# Patient Record
Sex: MALE | Race: WHITE | Employment: UNEMPLOYED | URBAN - METROPOLITAN AREA
[De-identification: names, ages, dates, MRNs, and addresses within clinical notes are randomized per-mention and may not be internally consistent; named-entity substitution may affect disease eponyms.]

---

## 2021-03-23 ENCOUNTER — APPOINTMENT (OUTPATIENT)
Dept: GENERAL RADIOLOGY | Age: 56
DRG: 194 | End: 2021-03-23
Payer: MEDICAID

## 2021-03-23 ENCOUNTER — APPOINTMENT (OUTPATIENT)
Dept: CT IMAGING | Age: 56
DRG: 194 | End: 2021-03-23
Payer: MEDICAID

## 2021-03-23 ENCOUNTER — HOSPITAL ENCOUNTER (INPATIENT)
Age: 56
LOS: 5 days | Discharge: HOME HEALTH | DRG: 194 | End: 2021-03-29
Attending: EMERGENCY MEDICINE | Admitting: INTERNAL MEDICINE
Payer: MEDICAID

## 2021-03-23 DIAGNOSIS — R00.0 TACHYCARDIA: ICD-10-CM

## 2021-03-23 DIAGNOSIS — R55 SYNCOPE AND COLLAPSE: ICD-10-CM

## 2021-03-23 DIAGNOSIS — E27.8 ADRENAL NODULE (HCC): ICD-10-CM

## 2021-03-23 DIAGNOSIS — J96.01 ACUTE RESPIRATORY FAILURE WITH HYPOXIA (HCC): Primary | ICD-10-CM

## 2021-03-23 DIAGNOSIS — R06.02 SHORTNESS OF BREATH: ICD-10-CM

## 2021-03-23 PROBLEM — M72.2 PLANTAR FASCIITIS, LEFT: Status: ACTIVE | Noted: 2017-04-07

## 2021-03-23 LAB
A/G RATIO: 1 (ref 1.1–2.2)
ALBUMIN SERPL-MCNC: 4.2 G/DL (ref 3.4–5)
ALP BLD-CCNC: 85 U/L (ref 40–129)
ALT SERPL-CCNC: 29 U/L (ref 10–40)
ANION GAP SERPL CALCULATED.3IONS-SCNC: 12 MMOL/L (ref 3–16)
AST SERPL-CCNC: 24 U/L (ref 15–37)
BASOPHILS ABSOLUTE: 0.1 K/UL (ref 0–0.2)
BASOPHILS RELATIVE PERCENT: 0.6 %
BILIRUB SERPL-MCNC: 0.3 MG/DL (ref 0–1)
BUN BLDV-MCNC: 17 MG/DL (ref 7–20)
CALCIUM SERPL-MCNC: 9.9 MG/DL (ref 8.3–10.6)
CHLORIDE BLD-SCNC: 100 MMOL/L (ref 99–110)
CO2: 29 MMOL/L (ref 21–32)
CREAT SERPL-MCNC: 0.7 MG/DL (ref 0.9–1.3)
D DIMER: 406 NG/ML DDU (ref 0–229)
EOSINOPHILS ABSOLUTE: 0.1 K/UL (ref 0–0.6)
EOSINOPHILS RELATIVE PERCENT: 0.6 %
GFR AFRICAN AMERICAN: >60
GFR NON-AFRICAN AMERICAN: >60
GLOBULIN: 4.4 G/DL
GLUCOSE BLD-MCNC: 166 MG/DL (ref 70–99)
HCT VFR BLD CALC: 43.6 % (ref 40.5–52.5)
HEMOGLOBIN: 14.1 G/DL (ref 13.5–17.5)
LYMPHOCYTES ABSOLUTE: 1.3 K/UL (ref 1–5.1)
LYMPHOCYTES RELATIVE PERCENT: 13.7 %
MCH RBC QN AUTO: 27.7 PG (ref 26–34)
MCHC RBC AUTO-ENTMCNC: 32.3 G/DL (ref 31–36)
MCV RBC AUTO: 85.9 FL (ref 80–100)
MONOCYTES ABSOLUTE: 0.6 K/UL (ref 0–1.3)
MONOCYTES RELATIVE PERCENT: 6.4 %
NEUTROPHILS ABSOLUTE: 7.5 K/UL (ref 1.7–7.7)
NEUTROPHILS RELATIVE PERCENT: 78.7 %
PDW BLD-RTO: 15.5 % (ref 12.4–15.4)
PLATELET # BLD: 254 K/UL (ref 135–450)
PMV BLD AUTO: 8.7 FL (ref 5–10.5)
POTASSIUM REFLEX MAGNESIUM: 3.9 MMOL/L (ref 3.5–5.1)
RBC # BLD: 5.07 M/UL (ref 4.2–5.9)
SODIUM BLD-SCNC: 141 MMOL/L (ref 136–145)
TOTAL PROTEIN: 8.6 G/DL (ref 6.4–8.2)
TROPONIN: <0.01 NG/ML
WBC # BLD: 9.5 K/UL (ref 4–11)

## 2021-03-23 PROCEDURE — 93005 ELECTROCARDIOGRAM TRACING: CPT | Performed by: EMERGENCY MEDICINE

## 2021-03-23 PROCEDURE — 85025 COMPLETE CBC W/AUTO DIFF WBC: CPT

## 2021-03-23 PROCEDURE — 71046 X-RAY EXAM CHEST 2 VIEWS: CPT

## 2021-03-23 PROCEDURE — 36415 COLL VENOUS BLD VENIPUNCTURE: CPT

## 2021-03-23 PROCEDURE — 85379 FIBRIN DEGRADATION QUANT: CPT

## 2021-03-23 PROCEDURE — 99284 EMERGENCY DEPT VISIT MOD MDM: CPT

## 2021-03-23 PROCEDURE — 84484 ASSAY OF TROPONIN QUANT: CPT

## 2021-03-23 PROCEDURE — 80053 COMPREHEN METABOLIC PANEL: CPT

## 2021-03-23 PROCEDURE — 70450 CT HEAD/BRAIN W/O DYE: CPT

## 2021-03-23 ASSESSMENT — ENCOUNTER SYMPTOMS
BACK PAIN: 0
SHORTNESS OF BREATH: 1
RHINORRHEA: 0
ABDOMINAL PAIN: 0

## 2021-03-24 ENCOUNTER — APPOINTMENT (OUTPATIENT)
Dept: GENERAL RADIOLOGY | Age: 56
DRG: 194 | End: 2021-03-24
Payer: MEDICAID

## 2021-03-24 ENCOUNTER — APPOINTMENT (OUTPATIENT)
Dept: CT IMAGING | Age: 56
DRG: 194 | End: 2021-03-24
Payer: MEDICAID

## 2021-03-24 PROBLEM — J96.01 ACUTE RESPIRATORY FAILURE WITH HYPOXIA (HCC): Status: ACTIVE | Noted: 2021-03-24

## 2021-03-24 PROBLEM — R07.9 CHEST PAIN: Status: ACTIVE | Noted: 2021-03-24

## 2021-03-24 LAB
ANION GAP SERPL CALCULATED.3IONS-SCNC: 9 MMOL/L (ref 3–16)
BASOPHILS ABSOLUTE: 0 K/UL (ref 0–0.2)
BASOPHILS RELATIVE PERCENT: 0.4 %
BILIRUBIN URINE: NEGATIVE
BLOOD, URINE: NEGATIVE
BUN BLDV-MCNC: 13 MG/DL (ref 7–20)
CALCIUM SERPL-MCNC: 9 MG/DL (ref 8.3–10.6)
CHLORIDE BLD-SCNC: 100 MMOL/L (ref 99–110)
CLARITY: CLEAR
CO2: 31 MMOL/L (ref 21–32)
COLOR: YELLOW
CREAT SERPL-MCNC: 0.6 MG/DL (ref 0.9–1.3)
EKG ATRIAL RATE: 91 BPM
EKG ATRIAL RATE: 95 BPM
EKG DIAGNOSIS: NORMAL
EKG DIAGNOSIS: NORMAL
EKG P AXIS: 76 DEGREES
EKG P-R INTERVAL: 172 MS
EKG P-R INTERVAL: 204 MS
EKG Q-T INTERVAL: 378 MS
EKG Q-T INTERVAL: 382 MS
EKG QRS DURATION: 146 MS
EKG QRS DURATION: 152 MS
EKG QTC CALCULATION (BAZETT): 469 MS
EKG QTC CALCULATION (BAZETT): 475 MS
EKG R AXIS: -61 DEGREES
EKG R AXIS: -63 DEGREES
EKG T AXIS: 63 DEGREES
EKG T AXIS: 90 DEGREES
EKG VENTRICULAR RATE: 91 BPM
EKG VENTRICULAR RATE: 95 BPM
EOSINOPHILS ABSOLUTE: 0 K/UL (ref 0–0.6)
EOSINOPHILS RELATIVE PERCENT: 0.4 %
GFR AFRICAN AMERICAN: >60
GFR NON-AFRICAN AMERICAN: >60
GLUCOSE BLD-MCNC: 133 MG/DL (ref 70–99)
GLUCOSE URINE: NEGATIVE MG/DL
HCT VFR BLD CALC: 40.7 % (ref 40.5–52.5)
HEMOGLOBIN: 13.4 G/DL (ref 13.5–17.5)
KETONES, URINE: NEGATIVE MG/DL
LEUKOCYTE ESTERASE, URINE: NEGATIVE
LYMPHOCYTES ABSOLUTE: 1.4 K/UL (ref 1–5.1)
LYMPHOCYTES RELATIVE PERCENT: 13.7 %
MCH RBC QN AUTO: 28.2 PG (ref 26–34)
MCHC RBC AUTO-ENTMCNC: 33 G/DL (ref 31–36)
MCV RBC AUTO: 85.6 FL (ref 80–100)
MICROSCOPIC EXAMINATION: NORMAL
MONOCYTES ABSOLUTE: 0.7 K/UL (ref 0–1.3)
MONOCYTES RELATIVE PERCENT: 7.3 %
NEUTROPHILS ABSOLUTE: 8 K/UL (ref 1.7–7.7)
NEUTROPHILS RELATIVE PERCENT: 78.2 %
NITRITE, URINE: NEGATIVE
PDW BLD-RTO: 15.6 % (ref 12.4–15.4)
PH UA: 7 (ref 5–8)
PLATELET # BLD: 215 K/UL (ref 135–450)
PMV BLD AUTO: 8.5 FL (ref 5–10.5)
POTASSIUM SERPL-SCNC: 3.9 MMOL/L (ref 3.5–5.1)
PRO-BNP: 891 PG/ML (ref 0–124)
PROTEIN UA: NEGATIVE MG/DL
RBC # BLD: 4.75 M/UL (ref 4.2–5.9)
SODIUM BLD-SCNC: 140 MMOL/L (ref 136–145)
SPECIFIC GRAVITY UA: 1.01 (ref 1–1.03)
TROPONIN: <0.01 NG/ML
TROPONIN: <0.01 NG/ML
TSH REFLEX: 1.04 UIU/ML (ref 0.27–4.2)
URINE REFLEX TO CULTURE: NORMAL
URINE TYPE: NORMAL
UROBILINOGEN, URINE: 1 E.U./DL
WBC # BLD: 10.2 K/UL (ref 4–11)

## 2021-03-24 PROCEDURE — 6360000004 HC RX CONTRAST MEDICATION: Performed by: EMERGENCY MEDICINE

## 2021-03-24 PROCEDURE — 83036 HEMOGLOBIN GLYCOSYLATED A1C: CPT

## 2021-03-24 PROCEDURE — 71260 CT THORAX DX C+: CPT

## 2021-03-24 PROCEDURE — 83880 ASSAY OF NATRIURETIC PEPTIDE: CPT

## 2021-03-24 PROCEDURE — 6360000002 HC RX W HCPCS: Performed by: INTERNAL MEDICINE

## 2021-03-24 PROCEDURE — 84443 ASSAY THYROID STIM HORMONE: CPT

## 2021-03-24 PROCEDURE — 2580000003 HC RX 258: Performed by: INTERNAL MEDICINE

## 2021-03-24 PROCEDURE — 36415 COLL VENOUS BLD VENIPUNCTURE: CPT

## 2021-03-24 PROCEDURE — 6370000000 HC RX 637 (ALT 250 FOR IP): Performed by: INTERNAL MEDICINE

## 2021-03-24 PROCEDURE — 81003 URINALYSIS AUTO W/O SCOPE: CPT

## 2021-03-24 PROCEDURE — 93010 ELECTROCARDIOGRAM REPORT: CPT | Performed by: INTERNAL MEDICINE

## 2021-03-24 PROCEDURE — 1200000000 HC SEMI PRIVATE

## 2021-03-24 PROCEDURE — 82088 ASSAY OF ALDOSTERONE: CPT

## 2021-03-24 PROCEDURE — 84484 ASSAY OF TROPONIN QUANT: CPT

## 2021-03-24 PROCEDURE — 85025 COMPLETE CBC W/AUTO DIFF WBC: CPT

## 2021-03-24 PROCEDURE — 73610 X-RAY EXAM OF ANKLE: CPT

## 2021-03-24 PROCEDURE — 93005 ELECTROCARDIOGRAM TRACING: CPT | Performed by: INTERNAL MEDICINE

## 2021-03-24 PROCEDURE — 80048 BASIC METABOLIC PNL TOTAL CA: CPT

## 2021-03-24 PROCEDURE — 84244 ASSAY OF RENIN: CPT

## 2021-03-24 RX ORDER — ASPIRIN 81 MG/1
81 TABLET, CHEWABLE ORAL DAILY
Status: DISCONTINUED | OUTPATIENT
Start: 2021-03-24 | End: 2021-03-25

## 2021-03-24 RX ORDER — SODIUM CHLORIDE 0.9 % (FLUSH) 0.9 %
10 SYRINGE (ML) INJECTION PRN
Status: DISCONTINUED | OUTPATIENT
Start: 2021-03-24 | End: 2021-03-29 | Stop reason: HOSPADM

## 2021-03-24 RX ORDER — ACETAMINOPHEN 325 MG/1
650 TABLET ORAL EVERY 6 HOURS PRN
Status: DISCONTINUED | OUTPATIENT
Start: 2021-03-24 | End: 2021-03-29 | Stop reason: HOSPADM

## 2021-03-24 RX ORDER — ONDANSETRON 2 MG/ML
4 INJECTION INTRAMUSCULAR; INTRAVENOUS EVERY 6 HOURS PRN
Status: DISCONTINUED | OUTPATIENT
Start: 2021-03-24 | End: 2021-03-29 | Stop reason: HOSPADM

## 2021-03-24 RX ORDER — HYDRALAZINE HYDROCHLORIDE 20 MG/ML
5 INJECTION INTRAMUSCULAR; INTRAVENOUS EVERY 4 HOURS PRN
Status: DISCONTINUED | OUTPATIENT
Start: 2021-03-24 | End: 2021-03-29 | Stop reason: HOSPADM

## 2021-03-24 RX ORDER — ACETAMINOPHEN 650 MG/1
650 SUPPOSITORY RECTAL EVERY 6 HOURS PRN
Status: DISCONTINUED | OUTPATIENT
Start: 2021-03-24 | End: 2021-03-29 | Stop reason: HOSPADM

## 2021-03-24 RX ORDER — METOPROLOL TARTRATE 50 MG/1
50 TABLET, FILM COATED ORAL 2 TIMES DAILY
Status: DISCONTINUED | OUTPATIENT
Start: 2021-03-24 | End: 2021-03-25

## 2021-03-24 RX ORDER — POLYETHYLENE GLYCOL 3350 17 G/17G
17 POWDER, FOR SOLUTION ORAL DAILY PRN
Status: DISCONTINUED | OUTPATIENT
Start: 2021-03-24 | End: 2021-03-29 | Stop reason: HOSPADM

## 2021-03-24 RX ORDER — SODIUM CHLORIDE 0.9 % (FLUSH) 0.9 %
10 SYRINGE (ML) INJECTION EVERY 12 HOURS SCHEDULED
Status: DISCONTINUED | OUTPATIENT
Start: 2021-03-24 | End: 2021-03-29 | Stop reason: HOSPADM

## 2021-03-24 RX ORDER — FUROSEMIDE 10 MG/ML
40 INJECTION INTRAMUSCULAR; INTRAVENOUS 2 TIMES DAILY
Status: DISCONTINUED | OUTPATIENT
Start: 2021-03-24 | End: 2021-03-28

## 2021-03-24 RX ORDER — PROMETHAZINE HYDROCHLORIDE 25 MG/1
12.5 TABLET ORAL EVERY 6 HOURS PRN
Status: DISCONTINUED | OUTPATIENT
Start: 2021-03-24 | End: 2021-03-29 | Stop reason: HOSPADM

## 2021-03-24 RX ADMIN — METOPROLOL TARTRATE 50 MG: 50 TABLET, FILM COATED ORAL at 13:21

## 2021-03-24 RX ADMIN — FUROSEMIDE 40 MG: 10 INJECTION, SOLUTION INTRAMUSCULAR; INTRAVENOUS at 12:43

## 2021-03-24 RX ADMIN — HYDRALAZINE HYDROCHLORIDE 5 MG: 20 INJECTION INTRAMUSCULAR; INTRAVENOUS at 20:01

## 2021-03-24 RX ADMIN — Medication 10 ML: at 20:01

## 2021-03-24 RX ADMIN — IOPAMIDOL 80 ML: 755 INJECTION, SOLUTION INTRAVENOUS at 00:37

## 2021-03-24 RX ADMIN — ENOXAPARIN SODIUM 40 MG: 40 INJECTION SUBCUTANEOUS at 10:33

## 2021-03-24 RX ADMIN — ASPIRIN 81 MG: 81 TABLET, CHEWABLE ORAL at 13:21

## 2021-03-24 RX ADMIN — FUROSEMIDE 40 MG: 10 INJECTION, SOLUTION INTRAMUSCULAR; INTRAVENOUS at 18:52

## 2021-03-24 RX ADMIN — Medication 10 ML: at 10:34

## 2021-03-24 ASSESSMENT — PAIN SCALES - GENERAL: PAINLEVEL_OUTOF10: 0

## 2021-03-24 NOTE — ED PROVIDER NOTES
Negative for back pain. Neurological: Positive for light-headedness. Negative for speech difficulty and weakness. PAST MEDICAL HISTORY   History reviewed. No pertinent past medical history. SURGICALHISTORY     History reviewed. No pertinent surgical history. CURRENT MEDICATIONS       Previous Medications    No medications on file       ALLERGIES     Cats claw (uncaria tomentosa)    FAMILY HISTORY     History reviewed. No pertinent family history.        SOCIAL HISTORY       Social History     Socioeconomic History    Marital status: Single     Spouse name: None    Number of children: None    Years of education: None    Highest education level: None   Occupational History    None   Social Needs    Financial resource strain: None    Food insecurity     Worry: None     Inability: None    Transportation needs     Medical: None     Non-medical: None   Tobacco Use    Smoking status: Never Smoker   Substance and Sexual Activity    Alcohol use: Yes     Comment: rare    Drug use: Never    Sexual activity: None   Lifestyle    Physical activity     Days per week: None     Minutes per session: None    Stress: None   Relationships    Social connections     Talks on phone: None     Gets together: None     Attends Jainism service: None     Active member of club or organization: None     Attends meetings of clubs or organizations: None     Relationship status: None    Intimate partner violence     Fear of current or ex partner: None     Emotionally abused: None     Physically abused: None     Forced sexual activity: None   Other Topics Concern    None   Social History Narrative    None       SCREENINGS             PHYSICAL EXAM    (up to 7 for level 4, 8 or more for level 5)     ED Triage Vitals [03/23/21 2143]   BP Temp Temp Source Pulse Resp SpO2 Height Weight   (!) 176/83 98.1 °F (36.7 °C) Oral 96 22 94 % 6' 2\" (1.88 m) (!) 453 lb (205.5 kg)       Physical Exam  Vitals signs and nursing note reviewed. Constitutional:       Appearance: Normal appearance. He is obese. He is not ill-appearing. HENT:      Head: Normocephalic and atraumatic. Right Ear: External ear normal.      Left Ear: External ear normal.      Nose: Nose normal.   Eyes:      General: No scleral icterus. Right eye: No discharge. Left eye: No discharge. Conjunctiva/sclera: Conjunctivae normal.   Neck:      Musculoskeletal: Neck supple. Cardiovascular:      Rate and Rhythm: Normal rate and regular rhythm. Heart sounds: Normal heart sounds. Pulmonary:      Effort: Pulmonary effort is normal. No respiratory distress. Breath sounds: Normal breath sounds. No wheezing or rales. Abdominal:      General: Bowel sounds are normal. There is no distension. Palpations: Abdomen is soft. Tenderness: There is no abdominal tenderness. There is no guarding or rebound. Skin:     Coloration: Skin is not pale. Neurological:      Mental Status: He is alert. Psychiatric:         Mood and Affect: Mood normal.         Behavior: Behavior normal.           DIAGNOSTIC RESULTS     EKG: All EKG's are interpreted by the Emergency Department Physician who either signs or Co-signs this chart in the absence of a cardiologist.    12 lead EKG shows normal sinus rhythm at a rate of 95 bpm, SC interval and QTc normal but he does have prolonged QRS. He has right bundle branch block and also left anterior fascicular block pattern. No acute ischemic findings and no previous for comparison. RADIOLOGY:   Non-plain film images such as CT, Ultrasound and MRI are read by the radiologist. Plain radiographic images are visualized and preliminarily interpreted by the emergency physician with the below findings:        Interpretation per the Radiologist below, if available at the time of this note:    CT CHEST PULMONARY EMBOLISM W CONTRAST   Final Result   1. Limited evaluation. No central pulmonary embolism.    2. 2.8 cm left adrenal nodule. Favor adenoma, although not clearly    characterized due to artifact. Consider adrenal protocol CT or chemical-   shift adrenal MRI follow-up study. 3.  Compression deformity of the T12 vertebral body and degenerative    changes. Age indeterminate, although favor chronic appearance. XR CHEST (2 VW)   Final Result   1. Low lung volumes otherwise no acute abnormality. CT HEAD WO CONTRAST   Final Result   1. No acute intracranial abnormality. ED BEDSIDE ULTRASOUND:   Performed by ED Physician - none    LABS:  Labs Reviewed   CBC WITH AUTO DIFFERENTIAL - Abnormal; Notable for the following components:       Result Value    RDW 15.5 (*)     All other components within normal limits    Narrative:     Performed at:  [facility]  [facility] [employer]  [facility] [employer]   Phone [phone]   COMPREHENSIVE METABOLIC PANEL W/ REFLEX TO MG FOR LOW K - Abnormal; Notable for the following components:    Glucose 166 (*)     CREATININE 0.7 (*)     Total Protein 8.6 (*)     Albumin/Globulin Ratio 1.0 (*)     All other components within normal limits    Narrative:     Performed at:  [facility]  [employer]  [facility] [employer]   Phone [phone]   D-DIMER, QUANTITATIVE - Abnormal; Notable for the following components:    D-Dimer, Quant 406 (*)     All other components within normal limits    Narrative:     Performed at:  [facility]  [employer]  [employer] [facility] [employer]   Phone [phone]   TROPONIN    Narrative:     Performed at:  [facility]  [employer] [employer]  [facility] [employer]   Phone [phone]       All other labs were within normal range or not returned as of this dictation.     EMERGENCY DEPARTMENT COURSE and DIFFERENTIAL DIAGNOSIS/MDM:   Vitals:    Vitals:    03/23/21 2143 03/24/21 0311   BP: (!) 176/83 (!) 161/66   Pulse: 96 107   Resp: 22 25   Temp: 98.1 °F (36.7 °C)    TempSrc: Oral    SpO2: 94% 94%   Weight: (!) 453 lb (205.5 kg)    Height: 6' 2\" (1.88 m)        Middle-age man who comes in for syncopal episode x2 and shortness of breath. Patient was immediately placed on cardiac monitor blood pressure and pulse oximetry monitoring. Laboratory chest x-ray EKG and CT head ordered. Cardiac monitor as interpreted by myself shows sinus tachycardia although he was not initially tachycardic. He becomes tachycardic with minimal movement. Laboratory studies show an elevated D-dimer. CT pulmonary embolism study is therefore ordered. Chest x-ray and EKG shows no acute abnormality. CT of his head was ordered because patient said he passed out twice and he is head was on the floor the car. I want to evaluate for any acute intracranial hemorrhage. .  This is negative for acute process. CT pulmonary embolism does not show any acute process. The exact cause of this patient's symptoms and not known however the patient will become tachycardic into the 130s and be dyspneic with just sitting up in bed, the patient does not have a primary care provider established and does not follow-up with the primary care setting. I believe the patient is high risk for serious life-threatening conditions and he should be further evaluated. A consult is placed to the hospitalist at [facility] who has accepted this patient to their service. CRITICAL CARE TIME   Total Critical Care time was 32 minutes, excluding separately reportable procedures. There was a high probability of clinically significant/life threatening deterioration in the patient's condition which required my urgent intervention. CONSULTS:  IP CONSULT TO HOSPITALIST    PROCEDURES:       Procedures    FINAL IMPRESSION      1. Syncope and collapse    2. Shortness of breath    3. Adrenal nodule ([address]    4.  Tachycardia

## 2021-03-24 NOTE — ED TRIAGE NOTES
Patient c/o SOB, brought in via [place] [facility], got SOB after got out of car, Patient denies CP, resp rapid, talking continuously.  Placed on O2 sat and cardiac monitor

## 2021-03-24 NOTE — ED NOTES
Patient waiting to hear about transportation. Transportation aware that pt needs a bariatric bed.      Lynette Saenz RN  03/24/21 8661

## 2021-03-24 NOTE — ED NOTES
Patient talking on telephone.  ST on monitor, waiting test results, states that he feels better     Gallito Gandhi RN  03/23/21 7190

## 2021-03-24 NOTE — ED NOTES
Patient report given to [facility] ems, patient belongings packed patient to [facility].      Sarah Braden RN  03/24/21 4049

## 2021-03-24 NOTE — CARE COORDINATION
CM Following, pt from home alone, ind at baseline, pt with co SOP CP and rt ankle /knee swelling, Xray ordered, pt with frequency bladder scan ordered, on RA at this time. Pt will need PT/OT evals for DC recs. Plans to DC home poss HHC at [facility].   Electronically signed by Bart Montague RN on 3/24/2021 at 1:16 PM  [phone]

## 2021-03-24 NOTE — ED NOTES
Report called to EUGENE South at [facility]. Aware pt ETA 0900. Will call back with updates before patient leaves.   RM [id number] [facility], EUGENE  03/24/21 EUGENE Reyes  03/24/21 110 [facility] Neetu Potter RN  03/24/21 6767 VIEW ALL

## 2021-03-24 NOTE — PLAN OF CARE
Problem: Falls - Risk of:  Goal: Will remain free from falls  Description: Will remain free from falls  Outcome: Met This Shift  Note: Pt has remained free of falls, as Pt is a fall risk. Bed/chair alarm remains armed and bed is in lowest position. Pt's room door remains open, and call light is within reach. Pt has on non-skid socks and calls out appropriately .  Pt is significantly unsteady, and requires 2 people to assist with ambulation

## 2021-03-24 NOTE — ED NOTES
Patient uncomfortable in bed, OK per EMD if patient sits up in chair. Wide chair placed in room, patient assisted from bed to chair,  Did not want to sit on a pillow. Denies CP or SOB. C/o R ankle pain, thinks he may have hurt earlier when getting out of car at [address] and falling. No obvious swelling. EMD aware.      Erica Loaiza RN  03/24/21 1753

## 2021-03-24 NOTE — H&P
History and Physical    Admit Date: 3/23/2021    Patient's PCP: Dr. Catalina Stone primary care provider on file. Chief complaint: shortness of breath and syncope. HISTORY OF PRESENT ILLNESS:    This is a very pleasant 50 y.o. male with morbid obesity (wt 453 lbs), but no apparent known chronic known medical conditions, who presented to the emergency department at [facility], with shortness of breath and syncope. He has had shortness of breath for months, but has really not sought evaluation for this, and has basically been compensating for it. Recently, he has had more shortness of breath than usual. He describes dyspnea on exertion, but denies cough or wheezing. He has also had bilateral leg swelling for several weeks to months. He denies chest pain, or palpitations. He has not had fever or chills. He does not do much, daily due to his morbid obesity. Today he had to do a secret  event. He drove to the store and as he was getting out of the store, he felt lightheaded and severely short of breath. The next thing he remembers is that he was found with his knees on the ground, and someone was yelling \". ..are you okay? \". He initially declined needing assistance however when he tried to get up again the same thing happened and the person called the ambulance. Patient denies any chest pain. He has never had this in the past.  He received the Covid vaccine about a week ago. He has had urinary frequency for some time as well as nocturia. In the ED, he was afebrile. Sats were 94, but noted there to be 87 on at least 2 occassions. RR ranged from 22-31. HR was 96 on presentation but noted to go into the 130s, atimes. BP was elevated at 176/83 (140s to 170s while in the ED). Labwork showed essentially normal CBC and CMP. D dimer was marginally elevated at 406. Troponin was negative. 12 lead EKG showed normal sinus rhythm at a rate of 95 bpm and apparent bifascicular block.   No acute ischemic findings and no previous for comparison. CT head : No acute intracranial abnormality; CT chest: Limited evaluation. No central pulmonary embolism. He was admitted for further evaluation.          Past Medical / Surgical History:    History reviewed. No pertinent past medical history. History reviewed. No pertinent surgical history. Medications Prior to Admission:    No current facility-administered medications on file prior to encounter. No current outpatient medications on file prior to encounter. Allergies:  Cats claw (uncaria tomentosa)    Social History:   TOBACCO:   reports that he has never smoked. He does not have any smokeless tobacco history on file. ETOH:   reports current alcohol use. Family History:   History reviewed. No pertinent family history. ROS: Review of Systems - Negative except as in HPI. All other systems reviewed and are negative. PHYSICAL EXAM:  BP (!) 170/96   Pulse 99   Temp 98.3 °F (36.8 °C)   Resp 24   Ht 6' 2\" (1.88 m)   Wt (!) 453 lb (205.5 kg)   SpO2 92%   BMI 58.16 kg/m²     No results for input(s): POCGLU in the last 72 hours. Constitutional:       Appearance: Normal appearance. He is morbidly obese. He is not ill-appearing. HENT:      Head: Normocephalic and atraumatic. Right Ear: External ear normal.      Left Ear: External ear normal.      Nose: Nose normal.   Eyes:      General: No scleral icterus. Right eye: No discharge. Left eye: No discharge. Conjunctiva/sclera: Conjunctivae normal.   Neck:      Musculoskeletal: Neck supple. Obese  Cardiovascular:      Rate and Rhythm: Normal rate Irregular rhythm. Heart sounds: Normal heart sounds. Bilateral pitting leg edema, grade 2-3 with some broken skin and serous drainage. Pulmonary:      Effort: Pulmonary effort is normal. No respiratory distress. Breath sounds: Normal breath sounds. No wheezing or rales.    Abdominal:      General: Bowel sounds be admitted to telemetry  We will trend his troponin  Check proBNP  Obtain echocardiogram  Check TSH, Hemoglobin A1c   Start IV Lasix  Consider initiating beta-blocker therapy  Patient will need ischemia evaluation  Cardiology consult      He has an adrenal incidentaloma which will require further characterization  We will obtain some initial hormonal work-up      We will require sleep study in the outpatient setting        Morbid obesity due to excess calories Body mass index is 58.16 kg/m². - Complicating assessment and treatment. Placing patient at risk for multiple co-morbidities as well as early death and contributing to the patient's presentation.  on weight loss       The patient and / or the family were informed of the results of any tests, a time was given to answer questions, a plan was proposed and they agreed with plan. Thank you Dr. Whelan primary care provider on file. for the opportunity to be involved in this patients care. If you have any questions or concerns please feel free to contact me at [phone].     Full Code    Evaristo Brody MD

## 2021-03-24 NOTE — PROGRESS NOTES
Pt complaining of pain in R ankle and R knee. +4 pitting edema noted in BLE. R ankle appears more swollen than L. MD notified     Updated: Pt's BP is 174/108.  MD notified

## 2021-03-24 NOTE — PROGRESS NOTES
4 Eyes Admission Assessment     I agree as the admission nurse that 2 RN's have performed a thorough Head to Toe Skin Assessment on the patient. ALL assessment sites listed below have been assessed on admission. Areas assessed by both nurses:   [x]   Head, Face, and Ears   [x]   Shoulders, Back, and Chest  [x]   Arms, Elbows, and Hands   [x]   Coccyx, Sacrum, and Ischium  [x]   Legs, Feet, and Heels        Does the Patient have Skin Breakdown?   No         - mositure-related redness noted in groin area and abdominal pannus  - blister R big toe  - scattered abrasions  - scattered bruising     Yuriy Prevention initiated:  Yes   Wound Care Orders initiated:  NA      Lars Ferrer consulted for Pressure Injury (Stage 3,4, Unstageable, DTI, NWPT, and Complex wounds) or Yuriy score 18 or lower:  No      Nurse 1 eSignature: Electronically signed by Lucrecia Hernandez RN on 3/24/21 at 6:00 PM EDT    **SHARE this note so that the co-signing nurse is able to place an eSignature**    Nurse 2 eSignature: Electronically signed by Wilmer Camara RN on 3/26/21 at 2:30 AM EDT

## 2021-03-24 NOTE — PROGRESS NOTES
Pt arrived to unit around 200. VSS with exception to elevated BP. Pt denies SOB, nausea, and pain at this time.  MD notified of Pt's arrival. Awaiting new orders

## 2021-03-24 NOTE — ED NOTES
Received call from Transportation, [facility] at [facility] tomorrow afternoon, they will call [employer] at 0800 to see if they can get a sooner time.       Lynette Saenz RN  03/24/21 9885

## 2021-03-24 NOTE — ED NOTES
Patient returned to bed from bedside commode patient updated on [facility] to [id number]  Cecile Segura [facility] Berenice Marshall md bedside to speak with patient.      Mary Briggs RN  03/24/21 1882

## 2021-03-25 PROBLEM — R55 SYNCOPE AND COLLAPSE: Status: ACTIVE | Noted: 2021-03-25

## 2021-03-25 PROBLEM — E66.9 OBESITY: Status: ACTIVE | Noted: 2021-03-25

## 2021-03-25 PROBLEM — I48.92 ATRIAL FLUTTER (HCC): Status: ACTIVE | Noted: 2021-03-25

## 2021-03-25 LAB
ANION GAP SERPL CALCULATED.3IONS-SCNC: 9 MMOL/L (ref 3–16)
BUN BLDV-MCNC: 16 MG/DL (ref 7–20)
CALCIUM SERPL-MCNC: 9.1 MG/DL (ref 8.3–10.6)
CHLORIDE BLD-SCNC: 98 MMOL/L (ref 99–110)
CO2: 32 MMOL/L (ref 21–32)
CORTISOL TOTAL: 13.1 UG/DL
CREAT SERPL-MCNC: 0.6 MG/DL (ref 0.9–1.3)
ESTIMATED AVERAGE GLUCOSE: 145.6 MG/DL
GFR AFRICAN AMERICAN: >60
GFR NON-AFRICAN AMERICAN: >60
GLUCOSE BLD-MCNC: 108 MG/DL (ref 70–99)
HBA1C MFR BLD: 6.7 %
HCT VFR BLD CALC: 39.3 % (ref 40.5–52.5)
HEMOGLOBIN: 12.9 G/DL (ref 13.5–17.5)
LV EF: 58 %
LVEF MODALITY: NORMAL
MAGNESIUM: 1.8 MG/DL (ref 1.8–2.4)
MCH RBC QN AUTO: 28.3 PG (ref 26–34)
MCHC RBC AUTO-ENTMCNC: 32.9 G/DL (ref 31–36)
MCV RBC AUTO: 86.1 FL (ref 80–100)
PDW BLD-RTO: 15.5 % (ref 12.4–15.4)
PLATELET # BLD: 210 K/UL (ref 135–450)
PMV BLD AUTO: 8.9 FL (ref 5–10.5)
POTASSIUM SERPL-SCNC: 3.4 MMOL/L (ref 3.5–5.1)
RBC # BLD: 4.56 M/UL (ref 4.2–5.9)
SODIUM BLD-SCNC: 139 MMOL/L (ref 136–145)
WBC # BLD: 8.7 K/UL (ref 4–11)

## 2021-03-25 PROCEDURE — 99223 1ST HOSP IP/OBS HIGH 75: CPT | Performed by: INTERNAL MEDICINE

## 2021-03-25 PROCEDURE — 97530 THERAPEUTIC ACTIVITIES: CPT

## 2021-03-25 PROCEDURE — 6360000002 HC RX W HCPCS: Performed by: INTERNAL MEDICINE

## 2021-03-25 PROCEDURE — 97162 PT EVAL MOD COMPLEX 30 MIN: CPT

## 2021-03-25 PROCEDURE — 97116 GAIT TRAINING THERAPY: CPT

## 2021-03-25 PROCEDURE — 97166 OT EVAL MOD COMPLEX 45 MIN: CPT

## 2021-03-25 PROCEDURE — 1200000000 HC SEMI PRIVATE

## 2021-03-25 PROCEDURE — C8929 TTE W OR WO FOL WCON,DOPPLER: HCPCS

## 2021-03-25 PROCEDURE — 6370000000 HC RX 637 (ALT 250 FOR IP): Performed by: INTERNAL MEDICINE

## 2021-03-25 PROCEDURE — 85027 COMPLETE CBC AUTOMATED: CPT

## 2021-03-25 PROCEDURE — 36415 COLL VENOUS BLD VENIPUNCTURE: CPT

## 2021-03-25 PROCEDURE — 6360000004 HC RX CONTRAST MEDICATION: Performed by: INTERNAL MEDICINE

## 2021-03-25 PROCEDURE — 2580000003 HC RX 258: Performed by: INTERNAL MEDICINE

## 2021-03-25 PROCEDURE — 82533 TOTAL CORTISOL: CPT

## 2021-03-25 PROCEDURE — 97535 SELF CARE MNGMENT TRAINING: CPT

## 2021-03-25 PROCEDURE — 83735 ASSAY OF MAGNESIUM: CPT

## 2021-03-25 PROCEDURE — 80048 BASIC METABOLIC PNL TOTAL CA: CPT

## 2021-03-25 RX ORDER — POTASSIUM CHLORIDE 20 MEQ/1
40 TABLET, EXTENDED RELEASE ORAL EVERY 4 HOURS
Status: COMPLETED | OUTPATIENT
Start: 2021-03-25 | End: 2021-03-25

## 2021-03-25 RX ORDER — MAGNESIUM SULFATE IN WATER 40 MG/ML
2000 INJECTION, SOLUTION INTRAVENOUS ONCE
Status: COMPLETED | OUTPATIENT
Start: 2021-03-25 | End: 2021-03-25

## 2021-03-25 RX ADMIN — APIXABAN 5 MG: 5 TABLET, FILM COATED ORAL at 21:16

## 2021-03-25 RX ADMIN — POTASSIUM CHLORIDE 40 MEQ: 1500 TABLET, EXTENDED RELEASE ORAL at 16:16

## 2021-03-25 RX ADMIN — Medication 10 ML: at 21:17

## 2021-03-25 RX ADMIN — PERFLUTREN 1.65 MG: 6.52 INJECTION, SUSPENSION INTRAVENOUS at 11:07

## 2021-03-25 RX ADMIN — Medication 10 ML: at 09:03

## 2021-03-25 RX ADMIN — APIXABAN 5 MG: 5 TABLET, FILM COATED ORAL at 16:15

## 2021-03-25 RX ADMIN — METOPROLOL TARTRATE 25 MG: 25 TABLET, FILM COATED ORAL at 21:16

## 2021-03-25 RX ADMIN — FUROSEMIDE 40 MG: 10 INJECTION, SOLUTION INTRAMUSCULAR; INTRAVENOUS at 09:02

## 2021-03-25 RX ADMIN — METOPROLOL TARTRATE 25 MG: 25 TABLET, FILM COATED ORAL at 11:35

## 2021-03-25 RX ADMIN — ENOXAPARIN SODIUM 40 MG: 40 INJECTION SUBCUTANEOUS at 09:02

## 2021-03-25 RX ADMIN — FUROSEMIDE 40 MG: 10 INJECTION, SOLUTION INTRAMUSCULAR; INTRAVENOUS at 17:45

## 2021-03-25 RX ADMIN — POTASSIUM CHLORIDE 40 MEQ: 1500 TABLET, EXTENDED RELEASE ORAL at 11:36

## 2021-03-25 RX ADMIN — ASPIRIN 81 MG: 81 TABLET, CHEWABLE ORAL at 09:03

## 2021-03-25 RX ADMIN — MAGNESIUM SULFATE IN WATER 2000 MG: 40 INJECTION, SOLUTION INTRAVENOUS at 17:31

## 2021-03-25 ASSESSMENT — PAIN SCALES - GENERAL
PAINLEVEL_OUTOF10: 0

## 2021-03-25 NOTE — CONSULTS
History and Physical  [facility]   Cardiology    Chief Complaint: progressive severe sob and admitted post syncope    HPI:     Patient is a 50 y.o. male  presents with a progressive 4 years increase in sob. He lately has only able to walk short distances without severe sob. At this course he has gained in the range of  lbs, now weighing 400-450 lbs depending on the scale. He was going to shop in a store for a \"study\" that he was being paid for. He just got out of his car, stood up sob. He then became lightheaded and fell, unconscious. After awakening a passer by came to help and after standing up again he passed out again. EMS was called by bystander. The EMS report is not available at this time. The patient was found  In an atypical flutter rhythm that spontaneously converted on the floor around 2:00 pm yesterday. He had another spell of syncope two years ago then he was totally breathless going to an office and was trying to conceal the fact that he was so. An urgent care visit was supposedly negative and he did not follow up. History reviewed. No pertinent past medical history. History reviewed. No pertinent surgical history. No medications prior to admission. Allergies   Allergen Reactions    Cats Claw (Uncaria Tomentosa) Hives      Social History     Tobacco Use    Smoking status: Never Smoker   Substance Use Topics    Alcohol use: Yes     Comment: rare      History reviewed. No pertinent family history.      Review of Systems:  · Constitutional: Weight gain  · Eyes: No decreased vision  · ENT: No Headaches, Hearing Loss or Vertigo  · Cardiovascular: dyspnea on exertion,  loss of consciousness  · Respiratory: No cough or wheezing    · Gastrointestinal: No abdominal pain, appetite loss, blood in stools, constipation, diarrhea or heartburn  · Genitourinary: No dysuria, trouble voiding, or hematuria  · Musculoskeletal:  No gait disturbance, weakness or joint complaints  · Integumentary: No rash or pruritis  · Neurological: No TIA or stroke symptoms  · Psychiatric: No anxiety or depression  · Endocrine: No malaise, fatigue or temperature intolerance  · Hematologic/Lymphatic: No bleeding problems, blood clots or swollen lymph nodes  · Allergic/Immunologic: No nasal congestion or hives      Objective Data:     /88   Pulse 70   Temp 97.7 °F (36.5 °C) (Oral)   Resp 20   Ht 6' 2\" (1.88 m)   Wt (!) 405 lb 6.8 oz (183.9 kg)   SpO2 90%   BMI 52.05 kg/m²     General appearance: alert, appears stated age, cooperative and morbidly obese  Alert, awake, oriented x 3  Eyes:  No erythema  Head: atraumatic  Neck: no carotid bruit and no JVD  Lungs: clear to auscultation bilaterally  Heart: regular rate and rhythm, S1, S2 normal, no murmur, click, rub or gallop  Abdomen: soft, non-tender; bowel sounds normal; no masses,  no organomegaly  Extremities: edema 2-3 plus  Skin: Skin color, texture, turgor normal. No rashes or lesions  Hematologic: no remarkable bruising       ECG: atrial flutter with variable:1 block, left anterior hemiblock or RBBB and left ventricular hypertrophy     Data Review    Recent Labs     03/23/21 2238 03/24/21  1054 03/25/21  0524    140 139   K 3.9 3.9 3.4*    100 98*   CO2 29 31 32   BUN 17 13 16   CREATININE 0.7* 0.6* 0.6*     Recent Labs     03/23/21 2238 03/24/21  1055 03/25/21  0524   WBC 9.5 10.2 8.7   HGB 14.1 13.4* 12.9*   HCT 43.6 40.7 39.3*   MCV 85.9 85.6 86.1    215 210     Lab Results   Component Value Date    TROPONINI <0.01 03/24/2021         Assessment:     Active Problems:    Acute respiratory failure with hypoxia (HCC)    Syncope and collapse    Atrial flutter (HCC)    Obesity  Resolved Problems:    * No resolved hospital problems. *      Plan:     1. The syncope presentation is logically atrial flutter with rapid rapid related. His sinus slowing episodes are confirmed during sleep by RN and go away when she awakens him.  He surely has sleep apnea as the cause of asymptomatic sinus bradycardia while asleep in hospital.  No pathologic av block has been observed yet. 2. His progressive sob is profound over last 3-4 years and logically related to his increasing weight, deconditioning , sleep apnea and likely diastolic heart disease from HT/obesity/ sleep apnea etc. Diuresis to improve breathing is logical for now. Echo is ordered  3. The atrial flutter appears atypical but likely right sided with V1 negative and also inferior leads. He of course has multiple factors that predispose to atrial flutter. Note metoprolol held for sinus bradycardia while asleep. Will try lower dose but again sleep apnea logical cause of kelli. Will monitor for recurrence of atrial flutter. His CHA2s2 vasc score appears to be 2. Strongly consider [address] Would start with low dose beta-blocker( likely syncope from rapid atrial flutter) and then if no more atrial flutter in hospital outpatient monitoring/possible ILR is future. 4. Obviously needs sleep apnea treated, weight loss, bp/diabetes treated.    5. Team will follow

## 2021-03-25 NOTE — PROGRESS NOTES
Progress Note    Admit Date: 3/23/2021    Patient's PCP: Dr. Shonda Lord primary care provider on file. Chief complaint: shortness of breath and syncope. 50 y.o. male with morbid obesity (wt 453 lbs), but no apparent known chronic known medical conditions, who presented to the emergency department at [facility], with shortness of breath and syncope. He has had shortness of breath for months, but has really not sought evaluation for this, and has basically been compensating for it. Recently, he has had more shortness of breath than usual. He describes dyspnea on exertion, but denies cough or wheezing. He has also had bilateral leg swelling for several weeks to months. He denies chest pain, or palpitations. He has not had fever or chills. He does not do much, daily due to his morbid obesity. Today he had to do a secret  event. He drove to the store and as he was getting out of the store, he felt lightheaded and severely short of breath. The next thing he remembers is that he was found with his knees on the ground, and someone was yelling \". ..are you okay? \". He initially declined needing assistance however when he tried to get up again the same thing happened and the person called the ambulance. Patient denies any chest pain. He has never had this in the past.  He received the Covid vaccine about a week ago. He has had urinary frequency for some time as well as nocturia. In the ED, he was afebrile. Sats were 94, but noted there to be 87 on at least 2 occassions. RR ranged from 22-31. HR was 96 on presentation but noted to go into the 130s, atimes. BP was elevated at 176/83 (140s to 170s while in the ED). Labwork showed essentially normal CBC and CMP. D dimer was marginally elevated at 406. Troponin was negative. 12 lead EKG showed normal sinus rhythm at a rate of 95 bpm and apparent bifascicular block. No acute ischemic findings and no previous for comparison.  CT head : No acute intracranial abnormality; CT chest: Limited evaluation. No central pulmonary embolism. He was admitted for further evaluation. Interval HPI  Noted overnight with episodes of bradycardia with heart rates down to the 30s, mainly while asleep    Denies new complaints today    No further episodes of syncope    No chest pain    No dyspnea    Fever or chills        PHYSICAL EXAM:  BP (!) 176/90   Pulse 77   Temp 97.7 °F (36.5 °C) (Oral)   Resp 20   Ht 6' 2\" (1.88 m)   Wt (!) 405 lb 6.8 oz (183.9 kg)   SpO2 91%   BMI 52.05 kg/m²     No results for input(s): POCGLU in the last 72 hours. Constitutional:       Appearance: Normal appearance. He is morbidly obese. He is not ill-appearing. Neck:      Musculoskeletal: Neck supple. Obese  Cardiovascular:      Rate and Rhythm: Normal rate Irregular rhythm. Heart sounds: Normal heart sounds. Bilateral pitting leg edema, grade 2-3 with some broken skin and serous drainage. Pulmonary:      Effort: Pulmonary effort is normal. No respiratory distress. Breath sounds: Normal breath sounds. No wheezing or rales. Abdominal:      General: Bowel sounds are normal. There is no distension. Palpations: Abdomen is soft. Tenderness: There is no abdominal tenderness. There is no guarding or rebound. Neurological:      Mental Status: He is alert.    Psychiatric:         Mood and Affect: Mood normal        LABS:  Recent Labs     03/23/21 2238 03/24/21  1055 03/25/21  0524   WBC 9.5 10.2 8.7   HGB 14.1 13.4* 12.9*   HCT 43.6 40.7 39.3*    215 210                                                                  Recent Labs     03/23/21 2238 03/24/21  1054 03/25/21  0524    140 139   K 3.9 3.9 3.4*    100 98*   CO2 29 31 32   BUN 17 13 16   CREATININE 0.7* 0.6* 0.6*   GLUCOSE 166* 133* 108*     Recent Labs     03/23/21 2238   AST 24   ALT 29   BILITOT 0.3   ALKPHOS 85     Recent Labs     03/23/21 2238 03/24/21  1055 03/24/21

## 2021-03-25 NOTE — PROGRESS NOTES
Educated patient on importance of accurate I/O when on IV lasix. Patient states he will watch and restrict his intake more, agreeable to using a graduated cylinder at bedside to urinate. Daily weights ordered.

## 2021-03-25 NOTE — PROGRESS NOTES
Pt is alert and oriented. Vsstable. No c/o of pain at this time. NPO. x2 with walker. No new skin issues not listed in LDA's. Plan for today is pt to go to ECHO. Pt did have episodes of SB but pt returned to normal range after being woken up. Will continue to monitor.

## 2021-03-25 NOTE — PROGRESS NOTES
Patient alert and oriented over night. BP elevated at start of shift - IV hydralazine given per PRN order. HR dropped into the 30s when sleeping, patient asymptomatic. Voiding in a graduate for accurate I/Os. Bed alarm remains on.

## 2021-03-25 NOTE — CARE COORDINATION
[facility]  CHF Team Conference Note      Patient ID: Rhenda Sicard 49 y.o. 1965    Admission Date: 3/23/2021   Admission Weight: 453lbs  Discharge Date: 3/29/2021  Discharge Weight: 430lbs    30 Day Readmit? No  Reason for Admission: Shortness of Breath; Syncope    Pt History and Precipitating Cause of Decompensation:   Pt has no documented CHF hx. Pt has not been seen by a physician in \"quite some time\" and does not has a PCP. Echo with minimal findings. Pt did still respond to diuresis. Given new HTN diagnosis, afib/aflutter concern, potential diabetes and potential sleep apnea pt has multiple risk factors for CHF. Pt educated on CHF and risk of further progression of Left ventricular hypertrophy to CHF. Pt to have even monitor for 2 weeks and follow up with EP NP in 6. Pt scheduled a 7 day CHF follow up, pt aware. Pt open to teaching and following with cardiology and a pcp regularly. Ejection Fraction: Summary 3/25/2021   Technically difficult examination. Left ventricular cavity size is normal.   There is moderate concentric left ventricular hypertrophy. Overall left   ventricular systolic function appears normal with an ejection fraction of   55-60%. No regional wall motion abnormalities are noted. Normal diastolic   function.     Has patient been diagnosed with MICH: No, given out patient sleep study referral.   Is patient being treated: No, being sent home on new home o2    Cardiology Consulted: Yes  Heart Failure Order Set Used: Yes  Complete Intake and Output: No  Complete Daily Weights: No    BMP:  Lab Results   Component Value Date     03/28/2021     03/27/2021     03/26/2021    K 4.3 03/28/2021    K 4.3 03/27/2021    K 3.9 03/26/2021    K 3.9 03/23/2021    CL 96 03/28/2021    CL 99 03/27/2021    CL 99 03/26/2021    CO2 25 03/28/2021    CO2 33 03/27/2021    CO2 31 03/26/2021    BUN 25 03/28/2021    BUN 22 03/27/2021    BUN 19 03/26/2021    CREATININE 0.8 03/28/2021    CREATININE 0.7 03/27/2021    CREATININE 0.7 03/26/2021     BNP:   Lab Results   Component Value Date    PROBNP 220 03/28/2021    PROBNP 891 03/24/2021           ACTIVITY/FUNCTIONAL ASSESSMENT:   Physical Therapy:  Assessment: Pt demo improved mobility/activity tolerance this date and now on 3L 02. Pt reports he will go home with 02 \"as needed\" and is pending sleep study. Pt denies any issues going home alone. We discussed at length working to DFine his apartment for safety and potentially seeking help for laundry which requires 2 flights of steps. Pt plans to return home at discharge. If home, recommend assist PRN. Pt would benefit from bariatric rolling walker for longer distances. Will sign off for acute PT. Recommend nursing encourage ambulation PRN and often with supervision    Occupational Therapy:  Assessment: Pt doing well with functional activities. No acute OT needs identified. Pt expresssed concerns with toileting hygiene; OT educated pt on use of wet wipes or a toilet bidet attachement. D/C pt from acute OT. Cardiac Rehab:  Patient provided with education: Yes  Patient given pamphlet: Yes  Patient provided information on how to contact: Yes      Time Spent Educating/Exercising: 15mins      NUTRITION:  Diet Orders / Intake / Nutrition Support  Current diet/supplement order: DIET CARB CONTROL; Low Sodium (2 GM)       Pt does not currently follow a low sodium diet at home. Pt states understanding of education. Instructed the Patient on:    Low Sodium foods   Sodium free   Fluids       Educational Materials Provided:    [facility] ([facility]) Heart Failure Education folder- Nutrition Therapy     -General Diet Recommendations: minimize processed foods, reduce sodium intake, minimize simple sugars and 2 liter/day fluid restriction.     Time spent with patient: 15 minutes           PHARMACY  During Admission  If EF <40% ACE/ARB ordered or contraindication documented:  EF > 40%  If EF <40% Evidence-Based Beta Blocker ordered or contraindication documented:   EF > 40%  If EF 35% or less, K <5on admit, Cr<2 (female) <2.5 (male), Aldosterone antagonist ordered:  EF > 35%  Upon Discharge  If EF <40% ACE/ARB ordered or contraindication documented:   If EF <40% Evidence-Based Beta Blocker ordered or contraindication documented:  If EF 35% or less, K <5on admit, Cr<2 (female) <2.5 (male), Aldosterone antagonist ordered:   If discharge on Spironalactone / Aldactone / Eppleronone/ Inspra - is the patient's Potassium acceptable N/A  Received Influenza Vaccine (Oct-Mar) Yes  DVT Prophylaxis by Day 2: Yes  Prescription drug coverage: Yes  Can afford prescription co-pay: No issues identified    Time Spent Educating: 10 min  Reviewed importance of taking medications as prescribed, and general tips to help remember when to take medications. Also discussed the importance of daily weights and when to call her doctor for changes in weight. Patient verbalized understanding and teach back method was used for verification. All questions answered. Patient told to call with further questions.     Pharmacist Signature:   Jose J Mcdonough, PharmD, BCPS, BCGP  [id number] ([facility])   3/29/2021 3:44 PM         NURSING  Patient Education:       Worsening HF symptoms Yes    fluid restriction  Yes    Tobacco cessation (Smoked in the last 12 months) No  daily weights and parameters  Yes  Avoid NSAIDS in HF Yes  written HF education given Yes     Follow up appointment Yes    ICD counseling No  Patient can Teach Back:   Worsening HF symptoms and when to report Yes   Weight gain to report to healthcare provider Yes  Amount of sodium to eat per day Yes  Name of their Bonna Colindres Yes    Time Spent Educatinmins        Total Time Spent on Patient Education:  60minutesYes    DISCHARGE PLAN:  Services at Discharge: [address] [facility], Occupational Therapy and CHF Nurse 3x week  Community Resources:   Equipment at

## 2021-03-25 NOTE — PLAN OF CARE
Problem: Pain:  Goal: Pain level will decrease  Description: Pain level will decrease  Outcome: Ongoing  Note: Silvio Salinas reported no pain during this shift. Goal: Control of acute pain  Description: Control of acute pain  Outcome: Ongoing  Goal: Control of chronic pain  Description: Control of chronic pain  Outcome: Ongoing     Problem: Falls - Risk of:  Goal: Will remain free from falls  Description: Will remain free from falls  3/25/2021 0224 by Keerthi España RN  Outcome: Ongoing  Note: Silvio Salinas remained safe and free of falls during this shift, a bed alarm was used to ensure safety. 3/24/2021 1504 by Kasey Gurrola RN  Outcome: Met This Shift  Note: Pt has remained free of falls, as Pt is a fall risk. Bed/chair alarm remains armed and bed is in lowest position. Pt's room door remains open, and call light is within reach. Pt has on non-skid socks and calls out appropriately . Pt is significantly unsteady, and requires 2 people to assist with ambulation  Goal: Absence of physical injury  Description: Absence of physical injury  Outcome: Ongoing     Problem: Fluid Volume:  Goal: Risk for excess fluid volume will decrease  Description: Risk for excess fluid volume will decrease  Outcome: Ongoing  Note: Silvio Salinas had good output after receiving lasix, he was educated on heart failure. Goal: Maintenance of adequate hydration will improve  Description: Maintenance of adequate hydration will improve  Outcome: Ongoing  Goal: Will show no signs and symptoms of electrolyte imbalance  Description: Will show no signs and symptoms of electrolyte imbalance  Outcome: Ongoing     Problem: Nutritional:  Goal: Maintenance of adequate nutrition will improve  Description: Maintenance of adequate nutrition will improve  Outcome: Ongoing  Note: Silvio Salinas was educated on importance of watching in intake in relation to heart failure.

## 2021-03-25 NOTE — PROGRESS NOTES
Occupational Therapy   Occupational Therapy Initial Assessment /Treatment/Discharge  Date: 3/25/2021   Patient Name: Tj Simpson  MRN: [id number]     : 1965    Date of Service: 3/25/2021    Discharge Recommendations:    Tj Simpson scored a 21/24 on the AM-PAC ADL Inpatient form. At this time, no further OT is recommended upon discharge. .  Recommend patient returns to prior setting with prior services. OT Equipment Recommendations  Equipment Needed: Yes    Assessment   Assessment: Pt doing well with functional activities. No acute OT needs identified. Pt expresssed concerns with toileting hygiene; OT educated pt on use of wet wipes or a toilet bidet attachement. D/C pt from acute OT. Prognosis: Good  Decision Making: Medium Complexity  OT Education: OT Role;Plan of Care;ADL Adaptive Strategies;Transfer Training;Energy Conservation;Equipment  Patient Education: Pt verbalized understanding  No Skilled OT: No OT goals identified  REQUIRES OT FOLLOW UP: No  Activity Tolerance  Activity Tolerance: Patient limited by fatigue  Activity Tolerance: Req frequent rests secondary to HARMON. Safety Devices  Safety Devices in place: Yes  Type of devices: Left in chair;Call light within reach; Chair alarm in place;Nurse notified           Restrictions  Position Activity Restriction  Other position/activity restrictions: up with assist    Subjective   General  Chart Reviewed: Yes  Additional Pertinent Hx: Pt admitted 3/23/21 with shortness of breath and syncope. Head CT(-)    CXR (-)    CT chest: neg PE, 2.8 cm left adrenal nodule. Favor adenoma.       x-ray Rt ankle= diffuse swelling, no fracture        PMH: morbid obesity  Family / Caregiver Present: No  Referring Practitioner: Dr. Bernie Pop  Diagnosis: Chest pain  Subjective  Subjective: Pt in bed upon entry.     Pt very talkative  Patient Currently in Pain: Denies  Vital Signs  Level of Consciousness: Alert (0)  Patient Currently in Pain: Denies by assistance(HOB elevated, with rail, increased effort to complete)  Transfers  Stand Step Transfers: Stand by assistance  Sit to stand: Stand by assistance  Stand to sit: Stand by assistance     Cognition  Overall Cognitive Status: WNL                 LUE AROM (degrees)  LUE AROM : WFL  RUE AROM (degrees)  RUE AROM : WFL  LUE Strength  Gross LUE Strength: WNL  RUE Strength  Gross RUE Strength: WNL     Hand Dominance  Hand Dominance: Right     Treatment included ADL and transfer training.         Plan   Plan  Plan Comment: Discharge pt from acute OT    AM-PAC Score        AM-[facility] Inpatient Daily Activity Raw Score: 21 (03/25/21 1529)  AM-PAC Inpatient ADL T-Scale Score : 44.27 (03/25/21 1529)  ADL Inpatient CMS 0-100% Score: 32.79 (03/25/21 1529)  ADL Inpatient CMS G-Code Modifier : CJ (03/25/21 1529)    Goals    No goals indicated       Therapy Time   Individual Concurrent Group Co-treatment   Time In 1154         Time Out 1240         Minutes 46         Timed Code Treatment Minutes: 1 Abdirizak Benedict, OTR/L [id number]

## 2021-03-25 NOTE — PROGRESS NOTES
Physical Therapy    Facility[facility]Department: [facility] [address] [place] [address]  Initial Assessment    NAME: Florence Adams  : 1965  MRN: [id number]    Date of Service: 3/25/2021    Discharge Recommendations:    Florence Adams scored a 18/24 on the AM-PAC short mobility form. Current research shows that an AM-PAC score of 18 or greater is typically associated with a discharge to the patient's home setting. Based on the patient's AM-PAC score and their current functional mobility deficits, it is recommended that the patient have 2-3 sessions per week of Physical Therapy at d/c to increase the patient's independence. At this time, this patient demonstrates the endurance and safety to discharge home with home PT and a follow up treatment frequency of 2-3x/wk. Please see assessment section for further patient specific details. If patient discharges prior to next session this note will serve as a discharge summary. Please see below for the latest assessment towards goals. PT Equipment Recommendations  Equipment Needed: (may benefit from rolling walker - pt states it helped but is concerned about tight spaces within apartment and ability to carry it up/down steps or to get it in/out of car if he would leave it in the car.)    Assessment   Body structures, Functions, Activity limitations: Decreased functional mobility ; Decreased endurance  Assessment: Pt likely close to baseline - reports normally walks short distances without AD PTA 2/2 SOB. Currently needing CG/SBA. Encouraged pt to be OOB and ambulating with staff as tolerated. Pt verbalized understanding. Plans to return home to apartment at d/c - reports mother is able to help him if needed and could stay in her apartment if unable to do steps.   Rec cont skilled PT to maximize mobility and independence  Treatment Diagnosis: impaired functional mobility 2/2 decreased endurance  Decision Making: Medium Complexity  PT Education: Goals;PT Role;Plan of Care;General Safety; Functional Mobility Training  Patient Education: Pt verbalized understanding       Patient Diagnosis(es): The primary encounter diagnosis was Syncope and collapse. Diagnoses of Shortness of breath, Adrenal nodule ([address] and Tachycardia were also pertinent to this visit. has no past medical history on file. has no past surgical history on file. Restrictions  Position Activity Restriction  Other position/activity restrictions: up with assist  Vision/Hearing        Subjective  General  Chart Reviewed: Yes  Additional Pertinent Hx: Pt is a 50 y.o. male adm 3/23 with chest pain. Pt presented to the emergency department for shortness of breath and syncope. Head CT: neg. CXR: neg. CT chest: neg PE, 2.8 cm left adrenal nodule. Favor adenoma. R ankle Xray: diffuse swelling, no fracture   PMH: morbid obesity  Referring Practitioner: Rajendra Stanley MD  Referral Date : 03/24/21  Subjective  Subjective: Pt found supine. Agreeable to PT. Talkative and pleasant  Pain Screening  Patient Currently in Pain: Denies  Vital Signs  Patient Currently in Pain: Denies       Orientation  Orientation  Overall Orientation Status: Within Functional Limits  Social/Functional History  Social/Functional History  Lives With: Alone  Type of Home: Apartment(2nd floor)  Home Layout: Laundry in basement  Home Access: Stairs to enter without rails  Entrance Stairs - Number of Steps: 2 PEGGY; 7+7 steps w/rail to apt level  Bathroom Shower/Tub: Tub/Shower unit  Bathroom Toilet: Standard(vanity next to toilet)  Bathroom Equipment: Hand-held shower  Home Equipment: Crutches  ADL Assistance: Independent  Homemaking Assistance: Independent  Ambulation Assistance: Independent(without AD. Reports can only walk short distances before needs to stop and catch breath.   Holds onto things in apartment while ambulating or when stopping to rest)  Transfer Assistance: Independent  Active : Yes  Occupation: Part time employment  Type of occupation: consumer surveys, secret   Additional Comments: No recent falls. Reports mother lives in same apartment building on main level - she can assist him if needed and he could stay in her apartment if he had to. Cognition        Objective          AROM RLE (degrees)  RLE AROM: WFL  AROM LLE (degrees)  LLE AROM : WFL  Strength RLE  Strength RLE: WFL  Strength LLE  Strength LLE: WFL        Bed mobility  Supine to Sit: Stand by assistance(HOB elevated, with rail, increased effort to complete)  Transfers  Sit to Stand: Stand by assistance  Stand to sit: Stand by assistance  Ambulation  Ambulation?: Yes  Ambulation 1  Device: No Device  Assistance: Contact guard assistance(to SBA)  Quality of Gait: decreased bilat step length/height, occas reaches out for UE support,HARMON  Distance: 10', 5'  Comments: Amb 21' with bariatric rolling walker with CGA. Cues to stay close to walker. HARMON.               Plan   Plan  Times per week: 2-5  Current Treatment Recommendations: Functional Mobility Training, Endurance Training, Gait Training, Stair training, Patient/Caregiver Education & Training, Safety Education & Training  Safety Devices  Type of devices: Call light within reach, Chair alarm in place, Nurse notified, Left in chair    G-Code       OutComes Score                                                  AM-PAC Score  AM-PAC Inpatient Mobility Raw Score : 18 (03/25/21 1317)  AM-PAC Inpatient T-Scale Score : 43.63 (03/25/21 1317)  Mobility Inpatient CMS 0-100% Score: 46.58 (03/25/21 1317)  Mobility Inpatient CMS G-Code Modifier : CK (03/25/21 1317)          Goals  Short term goals  Time Frame for Short term goals: By discharge  Short term goal 1: Sup to sit modified independent  Short term goal 2: Pt will transfer sit to stand supervision  Short term goal 3: Pt will amb >80' with LRAD with supervision  Short term goal 4: Pt will up/down flight of steps with rail supervision       Therapy Time   Individual

## 2021-03-26 LAB
ANION GAP SERPL CALCULATED.3IONS-SCNC: 11 MMOL/L (ref 3–16)
BUN BLDV-MCNC: 19 MG/DL (ref 7–20)
CALCIUM SERPL-MCNC: 9.2 MG/DL (ref 8.3–10.6)
CHLORIDE BLD-SCNC: 99 MMOL/L (ref 99–110)
CO2: 31 MMOL/L (ref 21–32)
CREAT SERPL-MCNC: 0.7 MG/DL (ref 0.9–1.3)
GFR AFRICAN AMERICAN: >60
GFR NON-AFRICAN AMERICAN: >60
GLUCOSE BLD-MCNC: 103 MG/DL (ref 70–99)
HCT VFR BLD CALC: 40.4 % (ref 40.5–52.5)
HEMOGLOBIN: 13.2 G/DL (ref 13.5–17.5)
MAGNESIUM: 2 MG/DL (ref 1.8–2.4)
MCH RBC QN AUTO: 28.4 PG (ref 26–34)
MCHC RBC AUTO-ENTMCNC: 32.7 G/DL (ref 31–36)
MCV RBC AUTO: 87 FL (ref 80–100)
PDW BLD-RTO: 15.4 % (ref 12.4–15.4)
PLATELET # BLD: 208 K/UL (ref 135–450)
PMV BLD AUTO: 9.1 FL (ref 5–10.5)
POTASSIUM SERPL-SCNC: 3.9 MMOL/L (ref 3.5–5.1)
RBC # BLD: 4.64 M/UL (ref 4.2–5.9)
SODIUM BLD-SCNC: 141 MMOL/L (ref 136–145)
WBC # BLD: 8.1 K/UL (ref 4–11)

## 2021-03-26 PROCEDURE — 83735 ASSAY OF MAGNESIUM: CPT

## 2021-03-26 PROCEDURE — 80048 BASIC METABOLIC PNL TOTAL CA: CPT

## 2021-03-26 PROCEDURE — 6370000000 HC RX 637 (ALT 250 FOR IP): Performed by: INTERNAL MEDICINE

## 2021-03-26 PROCEDURE — 2580000003 HC RX 258: Performed by: INTERNAL MEDICINE

## 2021-03-26 PROCEDURE — 97116 GAIT TRAINING THERAPY: CPT

## 2021-03-26 PROCEDURE — 99232 SBSQ HOSP IP/OBS MODERATE 35: CPT | Performed by: NURSE PRACTITIONER

## 2021-03-26 PROCEDURE — 6360000002 HC RX W HCPCS: Performed by: INTERNAL MEDICINE

## 2021-03-26 PROCEDURE — 36415 COLL VENOUS BLD VENIPUNCTURE: CPT

## 2021-03-26 PROCEDURE — 85027 COMPLETE CBC AUTOMATED: CPT

## 2021-03-26 PROCEDURE — 1200000000 HC SEMI PRIVATE

## 2021-03-26 RX ORDER — LORAZEPAM 2 MG/ML
4 INJECTION INTRAMUSCULAR ONCE
Status: DISCONTINUED | OUTPATIENT
Start: 2021-03-26 | End: 2021-03-26

## 2021-03-26 RX ORDER — METOPROLOL TARTRATE 50 MG/1
50 TABLET, FILM COATED ORAL 2 TIMES DAILY
Status: DISCONTINUED | OUTPATIENT
Start: 2021-03-26 | End: 2021-03-27

## 2021-03-26 RX ADMIN — Medication 10 ML: at 08:03

## 2021-03-26 RX ADMIN — METOPROLOL TARTRATE 50 MG: 50 TABLET, FILM COATED ORAL at 20:32

## 2021-03-26 RX ADMIN — Medication 10 ML: at 20:33

## 2021-03-26 RX ADMIN — APIXABAN 5 MG: 5 TABLET, FILM COATED ORAL at 20:32

## 2021-03-26 RX ADMIN — FUROSEMIDE 40 MG: 10 INJECTION, SOLUTION INTRAMUSCULAR; INTRAVENOUS at 08:02

## 2021-03-26 RX ADMIN — APIXABAN 5 MG: 5 TABLET, FILM COATED ORAL at 08:01

## 2021-03-26 RX ADMIN — METOPROLOL TARTRATE 25 MG: 25 TABLET, FILM COATED ORAL at 08:01

## 2021-03-26 RX ADMIN — FUROSEMIDE 40 MG: 10 INJECTION, SOLUTION INTRAMUSCULAR; INTRAVENOUS at 18:11

## 2021-03-26 ASSESSMENT — PAIN SCALES - GENERAL: PAINLEVEL_OUTOF10: 0

## 2021-03-26 NOTE — CONSULTS
Nutrition Education    Pt seen per [facility] for CHF diet education. Provided pt with written and verbal instruction on HF nutrition therapy. Discussed low sodium diet, daily weights, mg of sodium for low sodium foods, and high vs low sodium food options. RD also initiated education r/t carbohydrate counting. Pt voiced understanding. Encouraged pt to contact RD with any questions/concerns should they arise. Time spent: 15 minutes    · Verbally reviewed information with Patient  · Educated on CHF diet  · Written educational materials provided. · Contact name and number provided. · Refer to Patient Education activity for more details.     Electronically signed by Esther Cifuentes RD, LD on 3/26/21 at 3:30 PM EDT    Contact: [phone]

## 2021-03-26 NOTE — PROGRESS NOTES
Physical Therapy  Facility/Department: [facility]'[address]  Daily Treatment Note  NAME: Drew Bruno  : 1965  MRN: [id number]    Date of Service: 3/26/2021    Discharge Recommendations:    Drew Bruno scored a 18/24 on the AM-PAC short mobility form. Current research shows that an AM-PAC score of 18 or greater is typically associated with a discharge to the patient's home setting. Based on the patient's AM-PAC score and their current functional mobility deficits, it is recommended that the patient have 2-3 sessions per week of Physical Therapy at d/c to increase the patient's independence. At this time, this patient demonstrates the endurance and safety to discharge home with home PT and a follow up treatment frequency of 2-3x/wk. Please see assessment section for further patient specific details. If patient discharges prior to next session this note will serve as a discharge summary. Please see below for the latest assessment towards goals. PT Equipment Recommendations  Equipment Needed: (rolling walker)    Assessment   Body structures, Functions, Activity limitations: Decreased functional mobility ; Decreased endurance  Assessment: Likely close to baseline function. Limited by SOB. Able to transfer/ambulate short distance with SBA. Reaches out for UE support. Discussed with pt using walker for support. Pt verbalized understanding. \"I think that would help for longer distances but my apartment is so small, I have places I can hold. \"  Rec cont skilled PT to maximize mobility and independence  Treatment Diagnosis: impaired functional mobility 2/2 decreased endurance  PT Education: Goals;PT Role;Plan of Care;General Safety; Functional Mobility Training  Patient Education: Pt verbalized understanding  REQUIRES PT FOLLOW UP: Yes     Patient Diagnosis(es): The primary encounter diagnosis was Syncope and collapse.  Diagnoses of Shortness of breath, Adrenal nodule ([facility] [address] and Tachycardia were Mobility Inpatient CMS G-Code Modifier : CK (03/26/21 1453)          Goals  Short term goals  Time Frame for Short term goals: By discharge  Short term goal 1: Sup to sit modified independent. Ongoing  Short term goal 2: Pt will transfer sit to stand supervision. Ongoing  Short term goal 3: Pt will amb >80' with LRAD with supervision. Ongoing  Short term goal 4: Pt will up/down flight of steps with rail supervision.   Ongoing    Plan    Plan  Times per week: 2-5  Current Treatment Recommendations: Functional Mobility Training, Endurance Training, Gait Training, Stair training, Patient/Caregiver Education & Training, Safety Education & Training  Safety Devices  Type of devices: Call light within reach, Chair alarm in place, Nurse notified, Left in chair     Therapy Time   Individual Concurrent Group Co-treatment   Time In 0936         Time Out 0953         Minutes 17               Timed Code Treatment Minutes: 17      Total Treatment Minutes:  [address], PT

## 2021-03-26 NOTE — PROGRESS NOTES
Pt alert and oriented. VSS. Pt voiding adequately. Denies pain. Call light within reach.  Will continue to monitor

## 2021-03-26 NOTE — PROGRESS NOTES
Progress Note    Admit Date: 3/23/2021    Patient's PCP: Dr. Eloy Burkitt primary care provider on file. Chief complaint: shortness of breath and syncope. 49 y.o. male with morbid obesity (wt 453 lbs), but no apparent known chronic known medical conditions, who presented to the emergency department at [facility], with shortness of breath and syncope. He has had shortness of breath for months, but has really not sought evaluation for this, and has basically been compensating for it. Recently, he has had more shortness of breath than usual. He describes dyspnea on exertion, but denies cough or wheezing. He has also had bilateral leg swelling for several weeks to months. He denies chest pain, or palpitations. He has not had fever or chills. He does not do much, daily due to his morbid obesity. Today he had to do a secret  event. He drove to the store and as he was getting out of the store, he felt lightheaded and severely short of breath. The next thing he remembers is that he was found with his knees on the ground, and someone was yelling \". ..are you okay? \". He initially declined needing assistance however when he tried to get up again the same thing happened and the person called the ambulance. Patient denies any chest pain. He has never had this in the past.  He received the Covid vaccine about a week ago. He has had urinary frequency for some time as well as nocturia. In the ED, he was afebrile. Sats were 94, but noted there to be 87 on at least 2 occassions. RR ranged from 22-31. HR was 96 on presentation but noted to go into the 130s, atimes. BP was elevated at 176/83 (140s to 170s while in the ED). Labwork showed essentially normal CBC and CMP. D dimer was marginally elevated at 406. Troponin was negative. 12 lead EKG showed normal sinus rhythm at a rate of 95 bpm and apparent bifascicular block. No acute ischemic findings and no previous for comparison.  CT head : No acute intracranial abnormality; CT chest: Limited evaluation. No central pulmonary embolism. He was admitted for further evaluation. Interval HPI  Noted overnight with episodes of bradycardia with heart rates down to the 30s, mainly while asleep    Denies new complaints today    No further episodes of syncope    No chest pain    No dyspnea    Fever or chills        PHYSICAL EXAM:  /76   Pulse 56   Temp 98.2 °F (36.8 °C) (Oral)   Resp 18   Ht 6' 2\" (1.88 m)   Wt (!) 405 lb 6.8 oz (183.9 kg)   SpO2 93%   BMI 52.05 kg/m²     No results for input(s): POCGLU in the last 72 hours. Constitutional:       Appearance: Normal appearance. He is morbidly obese. He is not ill-appearing. Neck:      Musculoskeletal: Neck supple. Obese  Cardiovascular:      Rate and Rhythm: Normal rate Irregular rhythm. Heart sounds: Normal heart sounds. Bilateral pitting leg edema, grade 2-3 with some broken skin and serous drainage. Pulmonary:      Effort: Pulmonary effort is normal. No respiratory distress. Breath sounds: Normal breath sounds. No wheezing or rales. Abdominal:      General: Bowel sounds are normal. There is no distension. Palpations: Abdomen is soft. Tenderness: There is no abdominal tenderness. There is no guarding or rebound. Neurological:      Mental Status: He is alert.    Psychiatric:         Mood and Affect: Mood normal        LABS:  Recent Labs     03/24/21  1055 03/25/21  0524 03/26/21  0440   WBC 10.2 8.7 8.1   HGB 13.4* 12.9* 13.2*   HCT 40.7 39.3* 40.4*    210 208                                                                  Recent Labs     03/24/21  1054 03/25/21  0524 03/26/21  0440    139 141   K 3.9 3.4* 3.9    98* 99   CO2 31 32 31   BUN 13 16 19   CREATININE 0.6* 0.6* 0.7*   GLUCOSE 133* 108* 103*     Recent Labs     03/23/21 2238   AST 24   ALT 29   BILITOT 0.3   ALKPHOS 85     Recent Labs     03/23/21 2238 03/24/21  1055 03/24/21  1542 hormonal work-up  Will need o/p f/u for this: Per radiology, 2.8 cm left adrenal nodule.  Favor adenoma, although not clearly characterized due to artifact.  Consider adrenal protocol CT or chemical-shift adrenal MRI follow-up study      Will require sleep study in the outpatient setting    Will set up with Resident's clinic as he has no PCP     Pxt's mother concerned he may be depressed: We will screen for depression. Morbid obesity due to excess calories Body mass index is 52.05 kg/m². - Complicating assessment and treatment. Placing patient at risk for multiple co-morbidities as well as early death and contributing to the patient's presentation.  on weight loss       The patient and / or the family were informed of the results of any tests, a time was given to answer questions, a plan was proposed and they agreed with plan. Thank you Dr. Whelan primary care provider on file. for the opportunity to be involved in this patients care. If you have any questions or concerns please feel free to contact me at [phone]. Full Code       Disposition: Pending Progress.    Possible DC home in 1-2 days pending further diuresis      Brooke Orozco MD

## 2021-03-26 NOTE — PROGRESS NOTES
Physician Progress Note      Rohini Hernandez  [facility] #:                  [id number]  :                       1965  ADMIT DATE:       3/23/2021 9:42 PM  [address] [place] DATE:  RESPONDING  PROVIDER #:        Audie Grajeda MD          QUERY TEXT:    Pt admitted with acute on chronic CHF and has EF of 55 - 60% per 3/25 ECHO. If possible, please document in progress notes and discharge summary further   specificity regarding the type of acute on chronic CHF:    The medical record reflects the following:  Risk Factors: HTN, Morbid obesity, atrial flutter, MICH  Clinical Indicators: . Bilateral pitting leg edema  Treatment: Cardiology consult, ECHO, monitor  Options provided:  -- Acute on Chronic Diastolic CHF/HFpEF  -- Other - I will add my own diagnosis  -- Disagree - Not applicable / Not valid  -- Disagree - Clinically unable to determine / Unknown  -- Refer to Clinical Documentation Reviewer    PROVIDER RESPONSE TEXT:    This patient is in acute on chronic diastolic CHF/HFpEF.     Query created by: Eldon Dumont on 3/25/2021 7:41 PM      Electronically signed by:  Audie Grajeda MD 3/26/2021 3:30 PM

## 2021-03-26 NOTE — PLAN OF CARE
Problem: Falls - Risk of:  Goal: Will remain free from falls  Description: Will remain free from falls  Note: Pt is A&Ox4, is high fall risk, Pt educated and encouraged to use call light to notify and wait for assistance from staff before getting OOB, pt uses call light appropriately, has made no unsafe movements, no attempts to get OOB without assistance. Pt's bed alarm remained on throughout shift, bed in lowest position, 2/4 side rails up, call light and bedside table within reach. No falls so far this shift, will continue to monitor.

## 2021-03-26 NOTE — PLAN OF CARE
Problem: Falls - Risk of:  Goal: Will remain free from falls  Description: Will remain free from falls  3/26/2021 0907 by Lo Tovar RN  Outcome: Ongoing  Note: Pt bed in low position and alarm on. Non skid socks on. Belongings, bedside table, and call light within reach. 2/4 side rails up. Will continue to monitor. 3/26/2021 0236 by Eugenia Roberto RN  Note: Pt is A&Ox4, is high fall risk, Pt educated and encouraged to use call light to notify and wait for assistance from staff before getting OOB, pt uses call light appropriately, has made no unsafe movements, no attempts to get OOB without assistance. Pt's bed alarm remained on throughout shift, bed in lowest position, 2/4 side rails up, call light and bedside table within reach. No falls so far this shift, will continue to monitor. Problem: Fluid Volume:  Goal: Risk for excess fluid volume will decrease  Description: Risk for excess fluid volume will decrease  Outcome: Ongoing  Note: Will continue to administer fluids/ medications as needed.  Continue to monitor I/O's

## 2021-03-27 LAB
ANION GAP SERPL CALCULATED.3IONS-SCNC: 9 MMOL/L (ref 3–16)
BUN BLDV-MCNC: 22 MG/DL (ref 7–20)
CALCIUM SERPL-MCNC: 9.2 MG/DL (ref 8.3–10.6)
CHLORIDE BLD-SCNC: 99 MMOL/L (ref 99–110)
CO2: 33 MMOL/L (ref 21–32)
CREAT SERPL-MCNC: 0.7 MG/DL (ref 0.9–1.3)
GFR AFRICAN AMERICAN: >60
GFR NON-AFRICAN AMERICAN: >60
GLUCOSE BLD-MCNC: 114 MG/DL (ref 70–99)
HCT VFR BLD CALC: 43.9 % (ref 40.5–52.5)
HEMOGLOBIN: 14.2 G/DL (ref 13.5–17.5)
MAGNESIUM: 2 MG/DL (ref 1.8–2.4)
MCH RBC QN AUTO: 28.2 PG (ref 26–34)
MCHC RBC AUTO-ENTMCNC: 32.3 G/DL (ref 31–36)
MCV RBC AUTO: 87.3 FL (ref 80–100)
PDW BLD-RTO: 15.5 % (ref 12.4–15.4)
PLATELET # BLD: 239 K/UL (ref 135–450)
PMV BLD AUTO: 8.9 FL (ref 5–10.5)
POTASSIUM SERPL-SCNC: 4.3 MMOL/L (ref 3.5–5.1)
RBC # BLD: 5.03 M/UL (ref 4.2–5.9)
SODIUM BLD-SCNC: 141 MMOL/L (ref 136–145)
TROPONIN: <0.01 NG/ML
TROPONIN: <0.01 NG/ML
WBC # BLD: 8.3 K/UL (ref 4–11)

## 2021-03-27 PROCEDURE — 6360000002 HC RX W HCPCS: Performed by: INTERNAL MEDICINE

## 2021-03-27 PROCEDURE — 2580000003 HC RX 258: Performed by: INTERNAL MEDICINE

## 2021-03-27 PROCEDURE — 6370000000 HC RX 637 (ALT 250 FOR IP): Performed by: INTERNAL MEDICINE

## 2021-03-27 PROCEDURE — 99233 SBSQ HOSP IP/OBS HIGH 50: CPT | Performed by: NURSE PRACTITIONER

## 2021-03-27 PROCEDURE — 80048 BASIC METABOLIC PNL TOTAL CA: CPT

## 2021-03-27 PROCEDURE — 84484 ASSAY OF TROPONIN QUANT: CPT

## 2021-03-27 PROCEDURE — 83735 ASSAY OF MAGNESIUM: CPT

## 2021-03-27 PROCEDURE — 85027 COMPLETE CBC AUTOMATED: CPT

## 2021-03-27 PROCEDURE — 36415 COLL VENOUS BLD VENIPUNCTURE: CPT

## 2021-03-27 PROCEDURE — 93005 ELECTROCARDIOGRAM TRACING: CPT | Performed by: INTERNAL MEDICINE

## 2021-03-27 PROCEDURE — 1200000000 HC SEMI PRIVATE

## 2021-03-27 RX ORDER — ACETAMINOPHEN, ASPIRIN AND CAFFEINE 250; 250; 65 MG/1; MG/1; MG/1
2 TABLET, FILM COATED ORAL EVERY 6 HOURS PRN
Status: DISCONTINUED | OUTPATIENT
Start: 2021-03-27 | End: 2021-03-29 | Stop reason: HOSPADM

## 2021-03-27 RX ORDER — ACETAMINOPHEN, ASPIRIN AND CAFFEINE 250; 250; 65 MG/1; MG/1; MG/1
1 TABLET, FILM COATED ORAL EVERY 6 HOURS PRN
Status: DISCONTINUED | OUTPATIENT
Start: 2021-03-27 | End: 2021-03-27

## 2021-03-27 RX ADMIN — Medication 10 ML: at 10:28

## 2021-03-27 RX ADMIN — ACETAMINOPHEN, ASPIRIN AND CAFFEINE 2 TABLET: 250; 250; 65 TABLET, FILM COATED ORAL at 17:31

## 2021-03-27 RX ADMIN — FUROSEMIDE 40 MG: 10 INJECTION, SOLUTION INTRAMUSCULAR; INTRAVENOUS at 17:37

## 2021-03-27 RX ADMIN — METOPROLOL TARTRATE 12.5 MG: 25 TABLET, FILM COATED ORAL at 20:03

## 2021-03-27 RX ADMIN — ONDANSETRON 4 MG: 2 INJECTION INTRAMUSCULAR; INTRAVENOUS at 17:40

## 2021-03-27 RX ADMIN — Medication 10 ML: at 20:03

## 2021-03-27 RX ADMIN — FUROSEMIDE 40 MG: 10 INJECTION, SOLUTION INTRAMUSCULAR; INTRAVENOUS at 09:38

## 2021-03-27 RX ADMIN — ACETAMINOPHEN, ASPIRIN AND CAFFEINE 1 TABLET: 250; 250; 65 TABLET, FILM COATED ORAL at 10:57

## 2021-03-27 RX ADMIN — APIXABAN 5 MG: 5 TABLET, FILM COATED ORAL at 09:37

## 2021-03-27 RX ADMIN — APIXABAN 5 MG: 5 TABLET, FILM COATED ORAL at 20:03

## 2021-03-27 RX ADMIN — METOPROLOL TARTRATE 50 MG: 50 TABLET, FILM COATED ORAL at 09:37

## 2021-03-27 ASSESSMENT — PAIN SCALES - GENERAL
PAINLEVEL_OUTOF10: 0
PAINLEVEL_OUTOF10: 3
PAINLEVEL_OUTOF10: 3

## 2021-03-27 NOTE — PLAN OF CARE
Problem: Pain:  Goal: Pain level will decrease  Description: Pain level will decrease  Outcome: Ongoing  Note: C/o migraine type pain. Given Excedrin with improvement. Pain remained ~3-4. Pt able to fall asleep. Will cont to monitor     Problem: Falls - Risk of:  Goal: Will remain free from falls  Description: Will remain free from falls  Outcome: Ongoing  Note: Alarms activated. Call light in reach-uses appropriately. SBA w/ steady gait. Non skid socks on. Bed locked in lowest position with 3/4 rails up     Problem: Fluid Volume:  Goal: Risk for excess fluid volume will decrease  Description: Risk for excess fluid volume will decrease  Outcome: Ongoing  Note: Given Lasix as ordered . Cont's to have pitting edema (+2 BLE). Voiding adequately. Daily weights being completed.  Will monitor

## 2021-03-27 NOTE — PROGRESS NOTES
Progress Note    Admit Date: 3/23/2021    Patient's PCP: Dr. Eloy Burkitt primary care provider on file. Chief complaint: shortness of breath and syncope. 49 y.o. male with morbid obesity (wt 453 lbs), but no apparent known chronic known medical conditions, who presented to the emergency department at [facility], with shortness of breath and syncope. He has had shortness of breath for months, but has really not sought evaluation for this, and has basically been compensating for it. Recently, he has had more shortness of breath than usual. He describes dyspnea on exertion, but denies cough or wheezing. He has also had bilateral leg swelling for several weeks to months. He denies chest pain, or palpitations. He has not had fever or chills. He does not do much, daily due to his morbid obesity. Today he had to do a secret  event. He drove to the store and as he was getting out of the store, he felt lightheaded and severely short of breath. The next thing he remembers is that he was found with his knees on the ground, and someone was yelling \". ..are you okay? \". He initially declined needing assistance however when he tried to get up again the same thing happened and the person called the ambulance. Patient denies any chest pain. He has never had this in the past.  He received the Covid vaccine about a week ago. He has had urinary frequency for some time as well as nocturia. In the ED, he was afebrile. Sats were 94, but noted there to be 87 on at least 2 occassions. RR ranged from 22-31. HR was 96 on presentation but noted to go into the 130s, atimes. BP was elevated at 176/83 (140s to 170s while in the ED). Labwork showed essentially normal CBC and CMP. D dimer was marginally elevated at 406. Troponin was negative. 12 lead EKG showed normal sinus rhythm at a rate of 95 bpm and apparent bifascicular block. No acute ischemic findings and no previous for comparison.  CT head : No acute intracranial abnormality; CT chest: Limited evaluation. No central pulmonary embolism. He was admitted for further evaluation. Interval HPI  Noted overnight with episodes of bradycardia with heart rates down to the 30s, mainly while asleep    Denies new complaints today    No further episodes of syncope, but has had some lightheadedness. Noted with bradycardia with pauses overnight, and into this AM. Asked RN to hold AM metop dose, but appears was given. Appears not to be tolerating higher dose of metoprolol he was placed on by Cardiology and hence has decreased dose of metoprolol. Following. No chest pain    No dyspnea    Fever or chills        PHYSICAL EXAM:  /70   Pulse (!) 47   Temp 97.5 °F (36.4 °C) (Oral)   Resp 22   Ht 6' 2\" (1.88 m)   Wt (!) 430 lb 1.9 oz (195.1 kg)   SpO2 95%   BMI 55.22 kg/m²     No results for input(s): POCGLU in the last 72 hours. Constitutional:       Appearance: Normal appearance. He is morbidly obese. He is not ill-appearing. Neck:      Musculoskeletal: Neck supple. Obese  Cardiovascular:      Rate and Rhythm: Normal rate Irregular rhythm. Heart sounds: Normal heart sounds. Bilateral pitting leg edema, grade 2-3 with some broken skin and serous drainage. Pulmonary:      Effort: Pulmonary effort is normal. No respiratory distress. Breath sounds: Normal breath sounds. No wheezing or rales. Abdominal:      General: Bowel sounds are normal. There is no distension. Palpations: Abdomen is soft. Tenderness: There is no abdominal tenderness. There is no guarding or rebound. Neurological:      Mental Status: He is alert.    Psychiatric:         Mood and Affect: Mood normal        LABS:  Recent Labs     03/25/21  0524 03/26/21  0440 03/27/21  0542   WBC 8.7 8.1 8.3   HGB 12.9* 13.2* 14.2   HCT 39.3* 40.4* 43.9    208 239                                                                  Recent Labs     03/25/21  0524 03/26/21  0440 03/27/21  0542    141 141   K 3.4* 3.9 4.3   CL 98* 99 99   CO2 32 31 33*   BUN 16 19 22*   CREATININE 0.6* 0.7* 0.7*   GLUCOSE 108* 103* 114*     No results for input(s): AST, ALT, ALB, BILITOT, ALKPHOS in the last 72 hours. Recent Labs     03/24/21  1055 03/24/21  1542 03/27/21  0542   TROPONINI <0.01 <0.01 <0.01  <0.01     No results for input(s): BNP in the last 72 hours. No results found for: PHART, TQC9DYP, PO2ART  No results for input(s): INR in the last 72 hours. Recent Labs     03/24/21  1106   COLORU Yellow   PHUR 7.0   CLARITYU Clear   SPECGRAV 1.015   LEUKOCYTESUR Negative   UROBILINOGEN 1.0   BILIRUBINUR Negative   BLOODU Negative   GLUCOSEU Negative          Assessment & Plan:        Acute on chronic Dyspnea likely sec to Acute on chronic CHF, unknown type  Syncope  Atypical Atrial Flutter  Hypertension   Morbid obesity  Probable MICH  Adrenal Incidentaloma  Right ankle pain  Hx of fall with chronic T12 vertebral body compression fracture         Presentation with acute on chronic dyspnea likely due to acute on chronic CHF (undiagnosed)    Risk factors: Arrhythmia, obstructive sleep apnea, Hypertension. Syncopal episode probably sec to arrhythmias  - Abnormal rhythm on EKG/telemetry felt to be  Atypical Atrial flutter  per Cardiology/EP  Heart rates noted to be as high as in the 130s in the ED  Episodes of kelli down to 30s while asleep mainly    Ruled out for ACS    ProBNP: 891. Recheck tomorrow  Echocardiogram: report reviewed  TSH: normal at 1.04    Continue IV Lasix: Diuresing satisfactorily. Recheck ProBNP and AM labs. Possibly switch to PO lasix tomorrow. Started metoprolol. Held with bradycardia. Resumed by Cardiology at lower dose then increased. I am concerned about his bradycardia, william with biventricular block. Discussed with Cardiology. Monitor to ensure tolerating BB    Will need a monitor and close f/u at MI.     Discussed risks and benefits of [address] with pxt: agreeable: Started on eliquis: tolerating. Pxt has reports of being told he has high blood pressure and needs to get it checked out. EKG shows LVH. He probably has undiagnosed MICH. Undiagnosed HTN. Continue BP mgt: adjust meds as needed: Bps satisfactory      Hemoglobin A1c 6.7 with fasting B: likely DM type 2  Counseled re diet and weight  Discussed medication mgt: To consider Metformin at discharge. He has an adrenal incidentaloma which will require further characterization  Ordered some initial hormonal work-up  Will need o/p f/u for this: Per radiology, 2.8 cm left adrenal nodule.  Favor adenoma, although not clearly characterized due to artifact.  Consider adrenal protocol CT or chemical-shift adrenal MRI follow-up study      Will require sleep study in the outpatient setting    Will set up with Resident's clinic as he has no PCP     Pxt's mother concerned he may be depressed: We will screen for depression. Morbid obesity due to excess calories Body mass index is 55.22 kg/m². - Complicating assessment and treatment. Placing patient at risk for multiple co-morbidities as well as early death and contributing to the patient's presentation.  on weight loss       The patient and / or the family were informed of the results of any tests, a time was given to answer questions, a plan was proposed and they agreed with plan. Thank you Dr. Whelan primary care provider on file. for the opportunity to be involved in this patients care. If you have any questions or concerns please feel free to contact me at [phone]. Full Code       Disposition: Pending Progress.    Possible DC home pending progress william as related to HR/diuresis  Will need O/P monitor      Harsh Cabrera MD

## 2021-03-27 NOTE — PROGRESS NOTES
[facility]  DAILY PROGRESS NOTE    Admit Date: 3/23/2021       Chief Complaint: SOB, syncope     Interval History:   Patient seen and examined and notes reviewed. Patient is being followed for Aflutter. Pt up and sitting in the chair this morning. He denies chest pain, or palpitations, c/o some dizziness and continued SOB. He has had episode of bradycardia in the 30s overnight with 8.9 sec pause around 6am most likely due to severe untreated MICH.      In: 240 [P.O.:240]  Out: 2050    Wt Readings from Last 2 Encounters:   03/27/21 (!) 430 lb 1.9 oz (195.1 kg)         Data:   Scheduled Meds:   Scheduled Meds:   metoprolol tartrate  50 mg Oral BID    apixaban  5 mg Oral BID    sodium chloride flush  10 mL Intravenous 2 times per day    furosemide  40 mg Intravenous BID     Continuous Infusions:  PRN Meds:.sodium chloride flush, promethazine **OR** ondansetron, polyethylene glycol, acetaminophen **OR** acetaminophen, hydrALAZINE  Continuous Infusions:    Intake/Output Summary (Last 24 hours) at 3/27/2021 0916  Last data filed at 3/27/2021 0348  Gross per 24 hour   Intake 480 ml   Output 2850 ml   Net -2370 ml     Lab Data:  CBC:   Lab Results   Component Value Date    WBC 8.3 03/27/2021    HGB 14.2 03/27/2021     03/27/2021     BMP:  Lab Results   Component Value Date     03/27/2021    K 4.3 03/27/2021    K 3.9 03/23/2021    CL 99 03/27/2021    CO2 33 03/27/2021    BUN 22 03/27/2021    CREATININE 0.7 03/27/2021    GLUCOSE 114 03/27/2021     INR: No results found for: INR     CARDIAC LABS  ENZYMES:  Recent Labs     03/24/21  1055 03/24/21  1542 03/27/21  0542   TROPONINI <0.01 <0.01 <0.01  <0.01     FASTING LIPID PANEL:No results found for: HDL, LDLDIRECT, LDLCALC, TRIG, TSH  LIVER PROFILE:  Lab Results   Component Value Date    AST 24 03/23/2021    ALT 29 03/23/2021     BNP:   Lab Results   Component Value Date    PROBNP 891 03/24/2021     Iron Studies:  No results found for: FERRITIN 1. WEIGHT: Admit Weight: (!) 453 lb (205.5 kg)      Today  Weight: (!) 430 lb 1.9 oz (195.1 kg)   2. I/O     Intake/Output Summary (Last 24 hours) at 3/27/2021 0916  Last data filed at 3/27/2021 0348  Gross per 24 hour   Intake 480 ml   Output 2850 ml   Net -2370 ml       Cardiac Testing:   3/25/2021 Echo:  Technically difficult examination. Left ventricular cavity size is normal.   There is moderate concentric left ventricular hypertrophy. Overall left   ventricular systolic function appears normal with an ejection fraction of   55-60%. No regional wall motion abnormalities are noted. Normal diastolic   function. Telemetry: AF with rates 50-60    Objective:   Vitals: /68   Pulse 60   Temp 97.5 °F (36.4 °C) (Oral)   Resp 20   Ht 6' 2\" (1.88 m)   Wt (!) 430 lb 1.9 oz (195.1 kg)   SpO2 94%   BMI 55.22 kg/m²   General appearance: obese, alert, appears stated age and cooperative, No acute distress   Skin: Skin color, texture, turgor normal. No rashes or ecchymosis. Neck: obese neck, symmetrical, trachea midline   Lungs: CTA, no accessory muscle use, no respiratory distress  Heart: s1s2, irregular  Abdomen: soft, non-tender; bowel sounds normal  Extremities: 1+  edema, DP +  Psychiatric: normal insight and affect    Patient Active Problem List:     Plantar fasciitis, left     Chest pain     Acute respiratory failure with hypoxia (HCC)     Syncope and collapse     Atrial flutter (HCC)     Obesity        Assessment/Plan:     1.  Aflutter- metoprolol increased yesterday but over night kelli and pauses are concerning, decrease BB, continue AC, pt really needs MICH treated as quickly as possible and should be d/c with a monitor and close f/u  2. HFpEF- still overloaded, continue IV lasix, recheck BNP tomorrow, would consider adding herb if he will be compliant with f/u and labs      [address], ACNP, AGPCNP  Heart Failure  The [address] [place] [address]       Core Measures:   · Discharge instructions:   · LVEF documented:   · ACEI for LV dysfunction:   · Smoking Cessation:

## 2021-03-27 NOTE — PROGRESS NOTES
pt HR was jumping all over the place from 30s to 60s-70 when pt was asleep. And at few times dropped to 26,27 for a second and came back up. and at 0600, we detected HR 0, staff rushed to room to wake pt up, he woke right up and felt totally fine, no chestpain, n/v, HA, dizziness or whatsoever. HR remains in 60s when pt is awake. RN remains in room for 30mins to assess pt. Dr Nikkie Mathis was made aware of the event. Troponin and ekg were ordered. Rn made lab and RT aware of the orders.  Will continue to monitor

## 2021-03-27 NOTE — PROGRESS NOTES
C/o migraine pain (stated he felt the onset of one). Given Excedrin. Pain remains 3-4/10. Pt sitting in chair majority of day. Repositioned to bed now/sitting on side of bed eating dinner. Pitting edema remains in BLE. Stated he fell on R leg (in parking lot), which is more swollen than L. HR resting in 30s many times this shift- Cardiology aware. Metoprolol dose reduced. Pt alert/asymptomatic (states he feels dizzy at times).  Will cont to monitor

## 2021-03-28 LAB
ANION GAP SERPL CALCULATED.3IONS-SCNC: 16 MMOL/L (ref 3–16)
BUN BLDV-MCNC: 25 MG/DL (ref 7–20)
CALCIUM SERPL-MCNC: 9.4 MG/DL (ref 8.3–10.6)
CHLORIDE BLD-SCNC: 96 MMOL/L (ref 99–110)
CO2: 25 MMOL/L (ref 21–32)
CREAT SERPL-MCNC: 0.8 MG/DL (ref 0.9–1.3)
D DIMER: <200 NG/ML DDU (ref 0–229)
EKG ATRIAL RATE: 62 BPM
EKG DIAGNOSIS: NORMAL
EKG P AXIS: -1 DEGREES
EKG P-R INTERVAL: 146 MS
EKG Q-T INTERVAL: 484 MS
EKG QRS DURATION: 156 MS
EKG QTC CALCULATION (BAZETT): 491 MS
EKG R AXIS: -58 DEGREES
EKG T AXIS: 13 DEGREES
EKG VENTRICULAR RATE: 62 BPM
GFR AFRICAN AMERICAN: >60
GFR NON-AFRICAN AMERICAN: >60
GLUCOSE BLD-MCNC: 124 MG/DL (ref 70–99)
MAGNESIUM: 2 MG/DL (ref 1.8–2.4)
POTASSIUM SERPL-SCNC: 4.3 MMOL/L (ref 3.5–5.1)
PRO-BNP: 220 PG/ML (ref 0–124)
SODIUM BLD-SCNC: 137 MMOL/L (ref 136–145)

## 2021-03-28 PROCEDURE — 6360000002 HC RX W HCPCS: Performed by: INTERNAL MEDICINE

## 2021-03-28 PROCEDURE — 85379 FIBRIN DEGRADATION QUANT: CPT

## 2021-03-28 PROCEDURE — 80048 BASIC METABOLIC PNL TOTAL CA: CPT

## 2021-03-28 PROCEDURE — 2580000003 HC RX 258: Performed by: INTERNAL MEDICINE

## 2021-03-28 PROCEDURE — 36415 COLL VENOUS BLD VENIPUNCTURE: CPT

## 2021-03-28 PROCEDURE — 6370000000 HC RX 637 (ALT 250 FOR IP): Performed by: INTERNAL MEDICINE

## 2021-03-28 PROCEDURE — 93010 ELECTROCARDIOGRAM REPORT: CPT | Performed by: INTERNAL MEDICINE

## 2021-03-28 PROCEDURE — 83735 ASSAY OF MAGNESIUM: CPT

## 2021-03-28 PROCEDURE — 94680 O2 UPTK RST&XERS DIR SIMPLE: CPT

## 2021-03-28 PROCEDURE — 99232 SBSQ HOSP IP/OBS MODERATE 35: CPT | Performed by: NURSE PRACTITIONER

## 2021-03-28 PROCEDURE — 83880 ASSAY OF NATRIURETIC PEPTIDE: CPT

## 2021-03-28 PROCEDURE — 1200000000 HC SEMI PRIVATE

## 2021-03-28 RX ORDER — MAGNESIUM SULFATE IN WATER 40 MG/ML
2000 INJECTION, SOLUTION INTRAVENOUS ONCE
Status: COMPLETED | OUTPATIENT
Start: 2021-03-28 | End: 2021-03-28

## 2021-03-28 RX ORDER — FUROSEMIDE 40 MG/1
40 TABLET ORAL DAILY
Status: DISCONTINUED | OUTPATIENT
Start: 2021-03-28 | End: 2021-03-29 | Stop reason: HOSPADM

## 2021-03-28 RX ORDER — POTASSIUM CHLORIDE 20 MEQ/1
20 TABLET, EXTENDED RELEASE ORAL DAILY
Qty: 30 TABLET | Refills: 5 | Status: SHIPPED | OUTPATIENT
Start: 2021-03-28 | End: 2021-03-29 | Stop reason: SDUPTHER

## 2021-03-28 RX ORDER — KETOROLAC TROMETHAMINE 30 MG/ML
30 INJECTION, SOLUTION INTRAMUSCULAR; INTRAVENOUS ONCE
Status: COMPLETED | OUTPATIENT
Start: 2021-03-28 | End: 2021-03-28

## 2021-03-28 RX ORDER — FUROSEMIDE 40 MG/1
40 TABLET ORAL DAILY
Qty: 60 TABLET | Refills: 3 | Status: SHIPPED | OUTPATIENT
Start: 2021-03-28 | End: 2021-03-29 | Stop reason: SDUPTHER

## 2021-03-28 RX ORDER — PROCHLORPERAZINE EDISYLATE 5 MG/ML
10 INJECTION INTRAMUSCULAR; INTRAVENOUS ONCE
Status: COMPLETED | OUTPATIENT
Start: 2021-03-28 | End: 2021-03-28

## 2021-03-28 RX ORDER — METOPROLOL SUCCINATE 25 MG/1
25 TABLET, EXTENDED RELEASE ORAL DAILY
Qty: 30 TABLET | Refills: 3 | Status: SHIPPED | OUTPATIENT
Start: 2021-03-28 | End: 2021-03-29 | Stop reason: HOSPADM

## 2021-03-28 RX ADMIN — FUROSEMIDE 40 MG: 40 TABLET ORAL at 12:24

## 2021-03-28 RX ADMIN — KETOROLAC TROMETHAMINE 30 MG: 30 INJECTION, SOLUTION INTRAMUSCULAR at 12:15

## 2021-03-28 RX ADMIN — ACETAMINOPHEN, ASPIRIN AND CAFFEINE 2 TABLET: 250; 250; 65 TABLET, FILM COATED ORAL at 20:43

## 2021-03-28 RX ADMIN — METOPROLOL TARTRATE 12.5 MG: 25 TABLET, FILM COATED ORAL at 20:43

## 2021-03-28 RX ADMIN — ACETAMINOPHEN, ASPIRIN AND CAFFEINE 2 TABLET: 250; 250; 65 TABLET, FILM COATED ORAL at 03:24

## 2021-03-28 RX ADMIN — PROCHLORPERAZINE EDISYLATE 10 MG: 5 INJECTION INTRAMUSCULAR; INTRAVENOUS at 12:15

## 2021-03-28 RX ADMIN — Medication 10 ML: at 08:53

## 2021-03-28 RX ADMIN — FUROSEMIDE 40 MG: 10 INJECTION, SOLUTION INTRAMUSCULAR; INTRAVENOUS at 08:53

## 2021-03-28 RX ADMIN — ACETAMINOPHEN, ASPIRIN AND CAFFEINE 2 TABLET: 250; 250; 65 TABLET, FILM COATED ORAL at 09:13

## 2021-03-28 RX ADMIN — APIXABAN 5 MG: 5 TABLET, FILM COATED ORAL at 20:43

## 2021-03-28 RX ADMIN — MAGNESIUM SULFATE HEPTAHYDRATE 2000 MG: 40 INJECTION, SOLUTION INTRAVENOUS at 12:25

## 2021-03-28 RX ADMIN — APIXABAN 5 MG: 5 TABLET, FILM COATED ORAL at 08:53

## 2021-03-28 RX ADMIN — Medication 10 ML: at 20:44

## 2021-03-28 RX ADMIN — METOPROLOL TARTRATE 12.5 MG: 25 TABLET, FILM COATED ORAL at 08:53

## 2021-03-28 ASSESSMENT — PAIN SCALES - GENERAL
PAINLEVEL_OUTOF10: 5
PAINLEVEL_OUTOF10: 5
PAINLEVEL_OUTOF10: 4
PAINLEVEL_OUTOF10: 4
PAINLEVEL_OUTOF10: 5

## 2021-03-28 ASSESSMENT — PAIN DESCRIPTION - LOCATION: LOCATION: HEAD

## 2021-03-28 NOTE — DISCHARGE INSTR - COC
Discharge date:  ***    Code Status Order: Full Code   Advance Directives:      Admitting Physician:  Rupert Maravilla DO  PCP: No primary care provider on file. Discharging Nurse: [facility] Unit/Room#: 5303/5303-01  Discharging Unit Phone Number: ***    Emergency Contact:   Extended Emergency Contact Information  Primary Emergency Contact: Chaka Varma  Home Phone: [phone]  Relation: Parent    Past Surgical History:  History reviewed. No pertinent surgical history.     Immunization History:   Immunization History   Administered Date(s) Administered    COVID-19, Pfizer, PF, 30mcg/0.3mL 03/17/2021    Influenza, Quadv, IM, PF (6 mo and older Fluzone, Flulaval, Fluarix, and 3 yrs and older Afluria) 12/03/2020    Zoster Recombinant (Shingrix) 12/03/2020       Active Problems:  Patient Active Problem List   Diagnosis Code    Plantar fasciitis, left M72.2    Chest pain R07.9    Acute respiratory failure with hypoxia (HCC) J96.01    Syncope and collapse R55    Atrial flutter ([facility]) I48.92    Obesity E66.9       Isolation/Infection:   Isolation            No Isolation          Patient Infection Status       None to display            Nurse Assessment:  Last Vital Signs: /64   Pulse 67   Temp 97.9 °F (36.6 °C) (Axillary)   Resp 16   Ht 6' 2\" (1.88 m)   Wt (!) 429 lb 0.2 oz (194.6 kg)   SpO2 90%   BMI 55.08 kg/m²     Last documented pain score (0-10 scale): Pain Level: 5  Last Weight:   Wt Readings from Last 1 Encounters:   03/28/21 (!) 429 lb 0.2 oz (194.6 kg)     Mental Status:  {IP PT MENTAL STATUS:20030:::0}    IV Access:  { GLO IV ACCESS:908212386:::0}    Nursing Mobility/ADLs:  Walking   {CHP DME ADLs:225825916:::0}  Transfer  {CHP DME ADLs:314967587:::0}  Bathing  {CHP DME ADLs:398167841:::0}  Dressing  {CHP DME ADLs:666605083:::0}  Toileting  {CHP DME ADLs:713320791:::0}  Feeding  {CHP DME ADLs:137489473:::0}  Med Admin  {[facility] DME ADLs:557884293:::0}  Med Delivery [address] MED Delivery:207059698:::0}    Wound Care Documentation and Therapy:        Elimination:  Continence:   · Bowel: {YES / QF:17636}  · Bladder: {YES / DC:27853}  Urinary Catheter: {Urinary Catheter:439739426:::0}   Colostomy/Ileostomy/Ileal Conduit: {YES / LR:49471}       Date of Last BM: ***    Intake/Output Summary (Last 24 hours) at 3/28/2021 1106  Last data filed at 3/28/2021 1002  Gross per 24 hour   Intake 960 ml   Output 2975 ml   Net -2015 ml     I/O last 3 completed shifts: In: 1950 [P.O.:840;  I.V.:1110]  Out: 2700 [Urine:2700]    Safety Concerns:     {[facility] Safety Concerns:227624415:::0}    Impairments/Disabilities:      [address] Impairments/Disabilities:878529520:::0}    Nutrition Therapy:  Current Nutrition Therapy:   [address] [place] [address] Prashant GLO Diet List:165816522:::0}    Routes of Feeding: {[facility] DME Other Feedings:506814909:::0}  Liquids: {Slp liquid thickness:74036}  Daily Fluid Restriction: {[facility] DME Yes amt example:392779845:::0}  Last Modified Barium Swallow with Video (Video Swallowing Test): {Done Not Done MNEE:352202274:::6}    Treatments at the Time of Hospital Discharge:   Respiratory Treatments: ***  Oxygen Therapy:  {Therapy; copd oxygen:54246:::0}  Ventilator:    { CC Vent List:319996543:::0}    Rehab Therapies: {THERAPEUTIC INTERVENTION:6837676589}  Weight Bearing Status/Restrictions: {[facility] Weight Bearin:::0}  Other Medical Equipment (for information only, NOT a DME order):  {EQUIPMENT:808539208}  Other Treatments: ***    Patient's personal belongings (please select all that are sent with patient):  {[facility] DME Belongings:277700219:::0}    RN SIGNATURE:  {Esignature:364807203:::0}    CASE MANAGEMENT/SOCIAL WORK SECTION    Inpatient Status Date: ***    Readmission Risk Assessment Score:  Readmission Risk              Risk of Unplanned Readmission:        9           Discharging to Facility/ Agency   · Name:   · Address:  · Phone:  · Fax:    Dialysis Facility (if applicable)   · Name:  ·

## 2021-03-28 NOTE — PROGRESS NOTES
[facility] HEART [place]  DAILY PROGRESS NOTE    Admit Date: 3/23/2021       Chief Complaint: SOB, syncope     Interval History:   Patient seen and examined and notes reviewed. Patient is being followed for Aflutter. Pt sitting up in chair this morning, now on 3L O2, metoprolol decreased yesterday and less kelli overnight. He denies CP, palpitations, states SOB improved, still with edema.      In: 600 [P.O.:600]  Out: 1900    Wt Readings from Last 2 Encounters:   03/28/21 (!) 429 lb 0.2 oz (194.6 kg)       Data:   Scheduled Meds:   Scheduled Meds:   metoprolol tartrate  12.5 mg Oral BID    apixaban  5 mg Oral BID    sodium chloride flush  10 mL Intravenous 2 times per day    furosemide  40 mg Intravenous BID     Continuous Infusions:  PRN Meds:.aspirin-acetaminophen-caffeine, sodium chloride flush, promethazine **OR** ondansetron, polyethylene glycol, [Held by provider] acetaminophen **OR** [Held by provider] acetaminophen, hydrALAZINE  Continuous Infusions:    Intake/Output Summary (Last 24 hours) at 3/28/2021 0847  Last data filed at 3/28/2021 0752  Gross per 24 hour   Intake 1950 ml   Output 3100 ml   Net -1150 ml     Lab Data:  CBC:   Lab Results   Component Value Date    WBC 8.3 03/27/2021    HGB 14.2 03/27/2021     03/27/2021     BMP:  Lab Results   Component Value Date     03/28/2021    K 4.3 03/28/2021    K 3.9 03/23/2021    CL 96 03/28/2021    CO2 25 03/28/2021    BUN 25 03/28/2021    CREATININE 0.8 03/28/2021    GLUCOSE 124 03/28/2021     INR: No results found for: INR     CARDIAC LABS  ENZYMES:  Recent Labs     03/27/21  0542   TROPONINI <0.01  <0.01     FASTING LIPID PANEL:No results found for: HDL, LDLDIRECT, LDLCALC, TRIG, TSH  LIVER PROFILE:  Lab Results   Component Value Date    AST 24 03/23/2021    ALT 29 03/23/2021     BNP:   Lab Results   Component Value Date    PROBNP 220 03/28/2021    PROBNP 891 03/24/2021     Iron Studies:  No results found for: FERRITIN      1. WEIGHT: Admit Weight: (!) 453 lb (205.5 kg)      Today  Weight: (!) 429 lb 0.2 oz (194.6 kg)   2. I/O     Intake/Output Summary (Last 24 hours) at 3/28/2021 0847  Last data filed at 3/28/2021 0752  Gross per 24 hour   Intake 1950 ml   Output 3100 ml   Net -1150 ml       Cardiac Testing:   3/25/2021 Echo:  Technically difficult examination. Left ventricular cavity size is normal.   There is moderate concentric left ventricular hypertrophy. Overall left   ventricular systolic function appears normal with an ejection fraction of   55-60%. No regional wall motion abnormalities are noted. Normal diastolic   function. Telemetry: AF     Objective:   Vitals: BP (!) 141/78   Pulse 73   Temp 98.3 °F (36.8 °C) (Oral)   Resp 16   Ht 6' 2\" (1.88 m)   Wt (!) 429 lb 0.2 oz (194.6 kg)   SpO2 92%   BMI 55.08 kg/m²   General appearance: obese, alert, appears stated age and cooperative, No acute distress   Skin: Skin color, texture, turgor normal. No rashes or ecchymosis. Neck: obese neck, symmetrical, trachea midline   Lungs: CTA, no accessory muscle use, no respiratory distress  Heart: s1s2, irregular  Abdomen: soft, non-tender; bowel sounds normal  Extremities: 1+  edema, DP +  Psychiatric: normal insight and affect    Patient Active Problem List:     Plantar fasciitis, left     Chest pain     Acute respiratory failure with hypoxia (HCC)     Syncope and collapse     Atrial flutter (HCC)     Obesity        Assessment/Plan:     1. Aflutter- rate controlled with Metoprolol, would continue lower dose to avoid kelli until pt can get MICH eval and treated. Needs eval ASAP and would discharge with a monitor and close f/u  2. HFpEF- BNP improved, ok to change lasix to po  3.  HTN - recommend adding ACE/ARB       DAVE MIR AGPCNP  Heart Failure  [facility] [place]       Core Measures:   · Discharge instructions:   · LVEF documented:   · ACEI for LV dysfunction:   · Smoking Cessation:

## 2021-03-28 NOTE — PLAN OF CARE
Problem: Pain:  Goal: Pain level will decrease  Description: Pain level will decrease  Outcome: Ongoing  Note: Given Excedrin for migraine without much relief (remained a 5). Then given Toradol. Pt able to fall asleep and find comfort. Problem: Falls - Risk of:  Goal: Will remain free from falls  Description: Will remain free from falls  Outcome: Ongoing  Note: SBA w/ gripper socks and walker. Pt can become weak from SOB w/ exertion. Remains on 3L     Problem: Fluid Volume:  Goal: Risk for excess fluid volume will decrease  Description: Risk for excess fluid volume will decrease  Outcome: Ongoing  Note: Given 2 doses Lasix today. Voiding freqtyl. Pitting edema persists.  Will monitor

## 2021-03-28 NOTE — PROGRESS NOTES
pt saO2 dropped to 74% in RA, pt is currently on 3L at 92-93%. no c/o chestpain, sob, n/v. except some migraine. Dr Renee Villalobos was made aware, no new orders placed.  Will continue to monitor

## 2021-03-28 NOTE — PROGRESS NOTES
Progress Note    Admit Date: 3/23/2021    Patient's PCP: Dr. Rosa Live primary care provider on file. Chief complaint: shortness of breath and syncope. 49 y.o. male with morbid obesity (wt 453 lbs), but no apparent known chronic known medical conditions, who presented to the emergency department at [facility], with shortness of breath and syncope. He has had shortness of breath for months, but has really not sought evaluation for this, and has basically been compensating for it. Recently, he has had more shortness of breath than usual. He describes dyspnea on exertion, but denies cough or wheezing. He has also had bilateral leg swelling for several weeks to months. He denies chest pain, or palpitations. He has not had fever or chills. He does not do much, daily due to his morbid obesity. Today he had to do a secret  event. He drove to the store and as he was getting out of the store, he felt lightheaded and severely short of breath. The next thing he remembers is that he was found with his knees on the ground, and someone was yelling \". ..are you okay? \". He initially declined needing assistance however when he tried to get up again the same thing happened and the person called the ambulance. Patient denies any chest pain. He has never had this in the past.  He received the Covid vaccine about a week ago. He has had urinary frequency for some time as well as nocturia. In the ED, he was afebrile. Sats were 94, but noted there to be 87 on at least 2 occassions. RR ranged from 22-31. HR was 96 on presentation but noted to go into the 130s, atimes. BP was elevated at 176/83 (140s to 170s while in the ED). Labwork showed essentially normal CBC and CMP. D dimer was marginally elevated at 406. Troponin was negative. 12 lead EKG showed normal sinus rhythm at a rate of 95 bpm and apparent bifascicular block. No acute ischemic findings and no previous for comparison.  CT head : No acute intracranial abnormality; CT chest: Limited evaluation. No central pulmonary embolism. He was admitted for further evaluation. Interval HPI  Noted overnight past few days with episodes of bradycardia with heart rates down to the 30s, mainly while asleep    No further episodes of syncope, but has had some lightheadedness. Noted with bradycardia with pauses overnight 3/27/2021. Appeared not to be tolerating higher dose of metoprolol he was placed on by Cardiology and hence dose decreased. Appears better tolerating current dose. Has had headaches, hx of migraine    No Fever or chills    Has diuresed abt 9 litres. Less short of breath    Per RN, O2 90% on 3L: check d dimer and if elevated, obtain CTPE. Treat his migraine today          PHYSICAL EXAM:  /64   Pulse 67   Temp 97.9 °F (36.6 °C) (Axillary)   Resp 16   Ht 6' 2\" (1.88 m)   Wt (!) 429 lb 0.2 oz (194.6 kg)   SpO2 90%   BMI 55.08 kg/m²     No results for input(s): POCGLU in the last 72 hours. Constitutional:       Appearance: Normal appearance. He is morbidly obese. He is not ill-appearing. Neck:      Musculoskeletal: Neck supple. Obese  Cardiovascular:      Rate and Rhythm: Normal rate and rhythm. Heart sounds: Normal heart sounds. Bilateral pitting leg edema: improved. Pulmonary:      Effort: Pulmonary effort is normal. No respiratory distress. Breath sounds: Normal breath sounds. No wheezing or rales. Abdominal:      General: Bowel sounds are normal. There is no distension. Palpations: Abdomen is soft. Tenderness: There is no abdominal tenderness. There is no guarding or rebound. Neurological:      Mental Status: He is alert.    Psychiatric:         Mood and Affect: Mood normal        LABS:  Recent Labs     03/26/21  0440 03/27/21  0542   WBC 8.1 8.3   HGB 13.2* 14.2   HCT 40.4* 43.9    239                                                                  Recent Labs     03/26/21  0440 03/27/21  0542 03/28/21  0535    141 137   K 3.9 4.3 4.3   CL 99 99 96*   CO2 31 33* 25   BUN 19 22* 25*   CREATININE 0.7* 0.7* 0.8*   GLUCOSE 103* 114* 124*     No results for input(s): AST, ALT, ALB, BILITOT, ALKPHOS in the last 72 hours. Recent Labs     03/27/21  0542   TROPONINI <0.01  <0.01     No results for input(s): BNP in the last 72 hours. No results found for: PHART, LGR3HOG, PO2ART  No results for input(s): INR in the last 72 hours. No results for input(s): NITRITE, COLORU, PHUR, LABCAST, WBCUA, RBCUA, MUCUS, TRICHOMONAS, YEAST, BACTERIA, CLARITYU, SPECGRAV, LEUKOCYTESUR, UROBILINOGEN, BILIRUBINUR, BLOODU, GLUCOSEU, AMORPHOUS in the last 72 hours. Invalid input(s): Chilo Drake       Assessment & Plan:        Acute on chronic Dyspnea likely sec to Acute on chronic CHF, unknown type  Syncope  Atypical Atrial Flutter  Hypertension   Morbid obesity  Probable MICH  Adrenal Incidentaloma  Right ankle pain  Hx of fall with chronic T12 vertebral body compression fracture         Presentation with acute on chronic dyspnea likely due to acute on undiagnosed chronic CHF    Risk factors: Arrhythmia, obstructive sleep apnea, Hypertension (undiagnosed). Syncopal episode probably sec to arrhythmias  - Abnormal rhythm on EKG/telemetry felt to be  Atypical Atrial flutter per Cardiology/EP  Heart rates noted to be as high as in the 130s in the ED  Episodes of kelli down to 30s while asleep mainly during this hospitalization    Ruled out for ACS   Echocardiogram: report reviewed  TSH: normal at 1.04  Diuresed satisfactorily: edema less, ProBNP: 891 on admit: improved to 220: switch to PO lasix. D dimer < 200 makes VTE less likely    Will need Home O2: Morbid obsity with probable obesity hypoventilation syndrome, prob MICH, CHF. Started on metoprolol. Was held with bradycardia.  Resumed by Cardiology at lower dose then increased, but with significant bradycardia william while asleep in setting of likely MICH, william with biventricular block, discussed with Cardiology. Dose decreased. Monitor to ensure tolerating BB at current dose. Will need a cardiac monitor and close f/u at [place]. Discussed risks and benefits of [address] with pxt: agreeable: Started on eliquis: tolerating. Pxt does not have a diagnosis of HTN, but has reports of being told he has high blood pressure and needs to get it checked out. BP was elevated at 176/83 in the ED (140s to 170s while in the ED). EKG shows LVH. Improved with current meds. Monitor and adjust further as needed. Hemoglobin A1c 6.7 with fasting B: likely DM type 2  Counseled re diet and weight  Discussed medication mgt and he wants to think about it, and trial life styl modification. We discussed Metformin. Consider at follow up if med mgt needed: will defer for now. Seen by nutrition. He has an adrenal incidentaloma which will require further characterization  2.8 cm left adrenal nodule.  Per Radiol: \"favor adenoma, although not clearly characterized due to artifact.  Consider adrenal protocol CT or chemical-shift adrenal MRI follow-up study\"   - Discussed this with him  - Ordered some initial hormonal work-up  - Will need o/p f/u for this      Will require sleep study in the outpatient setting ASAP. Will need Home O2       Will set up with Resident's clinic as he has no PCP   To follow up 1 week after discharge with Cardiology      Morbid obesity due to excess calories Body mass index is 55.08 kg/m². - Complicating assessment and treatment. Placing patient at risk for multiple co-morbidities as well as early death and contributing to the patient's presentation.  on weight loss       The patient and / or the family were informed of the results of any tests, a time was given to answer questions, a plan was proposed and they agreed with plan. Thank you Dr. Whelan primary care provider on file. for the opportunity to be involved in this patients care.  If you have any questions or concerns please feel free to contact me at [phone].     Full Code        Disposition:  Will need Home O2 set up  Will need event monitor placed   Likely discharge tomorrow with Bishnu Briones MD

## 2021-03-28 NOTE — PROGRESS NOTES
Voiding freqtly. Becomes SOB w/ walking/exertion. Remains on 3L 02 (90%). When sitting up or on side, 02 can go up to 96%. Lungs sound clear. HR controlled today. C/o migraine pain (5/10 pain). Given Excedrin (remained a 5) and then Toradol+Compazine. Will monitor. No emesis yet.

## 2021-03-29 ENCOUNTER — NURSE ONLY (OUTPATIENT)
Dept: CARDIOLOGY CLINIC | Age: 56
End: 2021-03-29

## 2021-03-29 VITALS
HEART RATE: 71 BPM | RESPIRATION RATE: 18 BRPM | BODY MASS INDEX: 40.43 KG/M2 | WEIGHT: 315 LBS | DIASTOLIC BLOOD PRESSURE: 54 MMHG | OXYGEN SATURATION: 95 % | SYSTOLIC BLOOD PRESSURE: 116 MMHG | HEIGHT: 74 IN | TEMPERATURE: 97.9 F

## 2021-03-29 LAB
EKG ATRIAL RATE: 69 BPM
EKG DIAGNOSIS: NORMAL
EKG P AXIS: 40 DEGREES
EKG P-R INTERVAL: 184 MS
EKG Q-T INTERVAL: 412 MS
EKG QRS DURATION: 152 MS
EKG QTC CALCULATION (BAZETT): 441 MS
EKG R AXIS: -60 DEGREES
EKG T AXIS: 67 DEGREES
EKG VENTRICULAR RATE: 69 BPM
MAGNESIUM: 2.1 MG/DL (ref 1.8–2.4)
TROPONIN: <0.01 NG/ML

## 2021-03-29 PROCEDURE — 94664 DEMO&/EVAL PT USE INHALER: CPT

## 2021-03-29 PROCEDURE — 84484 ASSAY OF TROPONIN QUANT: CPT

## 2021-03-29 PROCEDURE — 99232 SBSQ HOSP IP/OBS MODERATE 35: CPT | Performed by: NURSE PRACTITIONER

## 2021-03-29 PROCEDURE — 93005 ELECTROCARDIOGRAM TRACING: CPT | Performed by: INTERNAL MEDICINE

## 2021-03-29 PROCEDURE — 2580000003 HC RX 258: Performed by: INTERNAL MEDICINE

## 2021-03-29 PROCEDURE — 6370000000 HC RX 637 (ALT 250 FOR IP): Performed by: INTERNAL MEDICINE

## 2021-03-29 PROCEDURE — 97530 THERAPEUTIC ACTIVITIES: CPT

## 2021-03-29 PROCEDURE — 94618 PULMONARY STRESS TESTING: CPT

## 2021-03-29 PROCEDURE — 36415 COLL VENOUS BLD VENIPUNCTURE: CPT

## 2021-03-29 PROCEDURE — 83735 ASSAY OF MAGNESIUM: CPT

## 2021-03-29 PROCEDURE — 97116 GAIT TRAINING THERAPY: CPT

## 2021-03-29 PROCEDURE — 93010 ELECTROCARDIOGRAM REPORT: CPT | Performed by: INTERNAL MEDICINE

## 2021-03-29 RX ORDER — POTASSIUM CHLORIDE 20 MEQ/1
20 TABLET, EXTENDED RELEASE ORAL DAILY
Qty: 30 TABLET | Refills: 5 | Status: SHIPPED | OUTPATIENT
Start: 2021-03-29 | End: 2021-05-11 | Stop reason: SDUPTHER

## 2021-03-29 RX ORDER — FUROSEMIDE 40 MG/1
40 TABLET ORAL DAILY
Qty: 60 TABLET | Refills: 3 | Status: SHIPPED | OUTPATIENT
Start: 2021-03-29 | End: 2021-05-11 | Stop reason: SDUPTHER

## 2021-03-29 RX ORDER — SUMATRIPTAN 6 MG/.5ML
6 INJECTION, SOLUTION SUBCUTANEOUS ONCE
Status: COMPLETED | OUTPATIENT
Start: 2021-03-29 | End: 2021-03-29

## 2021-03-29 RX ADMIN — ACETAMINOPHEN, ASPIRIN AND CAFFEINE 2 TABLET: 250; 250; 65 TABLET, FILM COATED ORAL at 08:51

## 2021-03-29 RX ADMIN — FUROSEMIDE 40 MG: 40 TABLET ORAL at 08:50

## 2021-03-29 RX ADMIN — METOPROLOL TARTRATE 12.5 MG: 25 TABLET, FILM COATED ORAL at 08:51

## 2021-03-29 RX ADMIN — APIXABAN 5 MG: 5 TABLET, FILM COATED ORAL at 08:50

## 2021-03-29 RX ADMIN — SUMATRIPTAN 6 MG: 6 INJECTION, SOLUTION SUBCUTANEOUS at 03:19

## 2021-03-29 RX ADMIN — Medication 10 ML: at 08:52

## 2021-03-29 ASSESSMENT — PAIN DESCRIPTION - LOCATION: LOCATION: HEAD

## 2021-03-29 ASSESSMENT — PAIN SCALES - GENERAL: PAINLEVEL_OUTOF10: 2

## 2021-03-29 ASSESSMENT — PAIN DESCRIPTION - ORIENTATION: ORIENTATION: MID

## 2021-03-29 ASSESSMENT — PAIN DESCRIPTION - PAIN TYPE: TYPE: ACUTE PAIN

## 2021-03-29 NOTE — CARE COORDINATION
Case Management Assessment            Discharge Note                    Date / Time of Note: 3/29/2021 1:52 PM                  Discharge Note Completed by: Abeba Morales    Patient Name: Alicia Thomas   YOB: 1965  Diagnosis: Chest pain [R07.9]  Acute respiratory failure with hypoxia [facility]) [J96.01]   Date / Time: 3/23/2021  9:42 PM    Current PCP: No primary care provider on file. Clinic patient: No    Hospitalization in the last 30 days: No    Advance Directives:  Code Status: Full Code  [place] DNR form completed and on chart: No    Financial:  Payor: Olivia Farmer / Plan: Olivia Farmer / Product Type: *No Product type* /      Pharmacy:  No Pharmacies [facility] [place] [employer] medications?: Potential Assistance Purchasing Medications: No  Assistance provided by Case Management: None at this time    Does patient want to participate in local refill/ meds to beds program?: Yes    Meds To [place] Rules:  1. Can ONLY be done Monday- Friday between 8:30am-5pm  2. Prescription(s) must be in pharmacy by 3pm to be filled same day  3. Copy of patient's insurance/ prescription drug card and patient face sheet must be sent along with the prescription(s)  4. Cost of Rx cannot be added to hospital bill. If financial assistance is needed, please contact unit  or ;  or  CANNOT provide pharmacy voucher for patients co-pays  5.  Patients can then  the prescription on their way out of the hospital at discharge, or pharmacy can deliver to the bedside if staff is available. (payment due at time of pick-up or delivery - cash, check, or card accepted)     Able to afford home medications/ co-pay costs: Yes    ADLS:  Current PT AM-PAC Score: 22 /24  Current OT AM-PAC Score: 21 /24      DISCHARGE Disposition: Home with [address]: [address] [facility] skilled care     LOC at discharge: Not Applicable  GLO Completed: Not Indicated    Notification completed in HENS/PAS?:  Not Applicable    IMM Completed:   Not Indicated    Transportation:  Transportation PLAN for discharge: friend   Mode of Transport: Private [facility]:  Home Care ordered at discharge: Yes  [address]: [facility]  Phone: [phone]  Fax: [phone]  Orders faxed: Yes, spoke with Eduardo Pizarro aware of DC today will follow at [facility]:  DME Provider: none  Equipment obtained during hospitalization:     Home Oxygen and Respiratory Equipment:  Oxygen needed at discharge?: Yes  [address]: [address]  Phone: [phone]  Portable tank available for discharge?: Yes, will be delivered to hospital before 5pm    Dialysis:  Dialysis patient: No    Dialysis Center:  Not Applicable        Additional CM Notes: Pt will Dc home with [address] [place] [address] skilled care and new Home O2 via [facility], spoke with Allison Cal[facility] O2 approved they will deliver O2 today before [facility] with Marsha  aware of DC will follow at home. Pt friend will drive home. Cards placed heart monitor, pt will follow up in the resident clinic until PCP can be set up. The Plan for Transition of Care is related to the following treatment goals of Chest pain [R07.9]  Acute respiratory failure with hypoxia ([address] [J96.01]    The Patient and/or patient representative Madalyn Vega and his family were provided with a choice of provider and agrees with the discharge plan Yes    Freedom of choice list was provided with basic dialogue that supports the patient's individualized plan of care/goals and shares the quality data associated with the providers.  Yes    Care Transitions patient: No    Roya Thompson RN  [facility] [employer]  Case Management Department  Ph: [phone]  Fax: [phone]

## 2021-03-29 NOTE — PROGRESS NOTES
Pt is alert and oriented. Vsstable. Pt on 3 L2. C/o of headache. Medication given. Pt no new skin issues not listed in LDA's. Pt SBA with walker. Pt/ot seen pt. Voiding without issues. Calls out appropriately. Will continue to monitor.

## 2021-03-29 NOTE — PROGRESS NOTES
Pt continued c/o heavy migraine, been taking of excedrin w minimal benefit, RN notified dr Romelia Munoz, pt received one time order of imitrex.  Will continue to monitor

## 2021-03-29 NOTE — CARE COORDINATION
CM following, LM for Milka at [address] [place] [address] for new home O2 needs. Order for 3L home O2. Plans to DC home with Marsha Phillips and new home O2. Electronically signed by Chichi Joseph RN on 3/29/2021 at 9:02 AM  [phone]    UPDATE:  Received call back from Adal at [facility] they can not take pt as Ins is out of network. Referral sent to Medical services spoke with Maribel Bhatt [phone] orders and clinical faxed to [phone]. They have to get authorization and then they will deliver port tank to hospital and then concentrator to his home, IF approved by ins.   Electronically signed by Chichi Joseph RN on 3/29/2021 at 11:25 AM  [phone]

## 2021-03-29 NOTE — FLOWSHEET NOTE
03/29/21 1623   Encounter Summary   Services provided to: Patient   Referral/Consult From: Rounding   Continue Visiting   (3/29, darline )   Complexity of Encounter Moderate   Length of Encounter 15 minutes   Routine   Type Initial   Assessment Calm; Approachable; Hopeful   Intervention Active listening;Explored feelings, thoughts, concerns   Outcome Comfort;Expressed gratitude   Staff Gaurav Salgado

## 2021-03-29 NOTE — PLAN OF CARE
Problem: Pain:  Goal: Pain level will decrease  Description: Pain level will decrease  Outcome: Ongoing  Note: Pt encouraged to use call light to notify staff when pain rises beyond tolerable. Pt educated on pain scale and was using call light appropriately. Problem: Falls - Risk of:  Goal: Will remain free from falls  Description: Will remain free from falls  Outcome: Ongoing  Note: Pt is A&Ox4, is high fall risk, Pt educated and encouraged to use call light to notify and wait for assistance from staff before getting OOB, pt uses call light appropriately, has made no unsafe movements, no attempts to get OOB without assistance. Pt's bed alarm remained on throughout shift, bed in lowest position, 2/4 side rails up, call light and bedside table within reach. No falls so far this shift, will continue to monitor.

## 2021-03-29 NOTE — PROGRESS NOTES
Electrophysiology - PROGRESS NOTE    Admit Date: 3/23/2021     Chief Complaint: AF/AFL, SB     Interval History:   Patient seen and examined and notes reviewed. Patient is being followed for AF/AFL, SB. He states he could not tell that he was in AF/AFL - only felt near syncopal. He does note some dizziness when bending over. Patient states he is SOB with exertion when he gets up in the morning and this last about 15 minutes. Patient states his last syncopal event, had driven to a store, got out of the car, started to feel dizzy, woke up and then passed out again. Had not had anything to eat or drink.      In: 240 [P.O.:240]  Out: 1400    Wt Readings from Last 2 Encounters:   03/29/21 (!) 430 lb 1.9 oz (195.1 kg)       Data:   Scheduled Meds:   Scheduled Meds:   furosemide  40 mg Oral Daily    metoprolol tartrate  12.5 mg Oral BID    apixaban  5 mg Oral BID    sodium chloride flush  10 mL Intravenous 2 times per day     Continuous Infusions:  PRN Meds:.aspirin-acetaminophen-caffeine, sodium chloride flush, promethazine **OR** ondansetron, polyethylene glycol, [Held by provider] acetaminophen **OR** [Held by provider] acetaminophen, hydrALAZINE  Continuous Infusions:    Intake/Output Summary (Last 24 hours) at 3/29/2021 0909  Last data filed at 3/29/2021 0755  Gross per 24 hour   Intake 840 ml   Output 2550 ml   Net -1710 ml       CBC:   Lab Results   Component Value Date    WBC 8.3 03/27/2021    HGB 14.2 03/27/2021     03/27/2021     BMP:  Lab Results   Component Value Date     03/28/2021    K 4.3 03/28/2021    K 3.9 03/23/2021    CL 96 03/28/2021    CO2 25 03/28/2021    BUN 25 03/28/2021    CREATININE 0.8 03/28/2021    GLUCOSE 124 03/28/2021     INR: No results found for: INR     CARDIAC LABS  ENZYMES:  Recent Labs     03/27/21  0542 03/29/21  0501   TROPONINI <0.01  <0.01 <0.01     FASTING LIPID PANEL:No results found for: HDL, LDLDIRECT, LDLCALC, TRIG, TSH  LIVER PROFILE:  Lab Results   Component Value Date    AST 24 03/23/2021    ALT 29 03/23/2021       -----------------------------------------------------------------  Telemetry: Personally reviewed  NSR, SB, AF/AFL    Objective:   Vitals: /70   Pulse 76   Temp 98 °F (36.7 °C) (Oral)   Resp 18   Ht 6' 2\" (1.88 m)   Wt (!) 430 lb 1.9 oz (195.1 kg)   SpO2 90%   BMI 55.22 kg/m²   General appearance: alert, appears stated age and cooperative, No acute distress   Eyes: Conjunctiva and pupils normal and reactive  Skin: Skin color, texture, turgor normal. No rashes or ecchymosis. Neck: no JVD, supple, symmetrical, trachea midline   Lungs: , no accessory muscle use, no respiratory distress  Heart: RRR  Abdomen: soft, non-tender; bowel sounds normal  Extremities: No edema, DP +  Psychiatric: normal insight and affect    Patient Active Problem List:     Plantar fasciitis, left     Chest pain     Acute respiratory failure with hypoxia (HCC)     Syncope and collapse     Atrial flutter (HCC)     Obesity        Assessment & Plan:      1. Syncope  2. AF/AFL  3. MICH  4. Sinus kelli  5. HTN    50 y/o man with a h/o HTN, morbid obesity and syncope who p/w SOB and syncope and found to be in AF/AFL. [id number] 2. TSH 1.04 (3/24/2021).      AF  - In NSR  - On Eliquis 5 mg - no s/s bleeding - continue  - On metoprolol 12.5 mg BID - low HR   - Sleep study outpatient  - Keep K+ > 4.0 and Mg > 2.0  - Echo - LA 4.3/47.7, normal EF    Syncope  - 1 episode in the past  - Outpatient monitor  - Lifetyle modification - diet, exercise and weight loss, hydration      Abbey Mejia CNP  [address]

## 2021-03-29 NOTE — PROGRESS NOTES
Physical Therapy  Facility/Department: [facility]'[address]  Daily Treatment Note/discharge note    NAME: Bartolo Santiago  : 1965  MRN: [id number]    Date of Service: 3/29/2021    Discharge Recommendations:Abdirahman Millard scored a 22/24 on the AM-PAC short mobility form. Current research shows that an AM-PAC score of 18 or greater is typically associated with a discharge to the patient's home setting. PT Equipment Recommendations  Equipment Needed: (bariatric rolling walker)    Assessment   Assessment: Pt demo improved mobility/activity tolerance this date and now on 3L 02. Pt reports he will go home with 02 \"as needed\" and is pending sleep study. Pt denies any issues going home alone. We discussed at length working to [employer] his apartment for safety and potentially seeking help for laundry which requires 2 flights of steps. Pt plans to return home at discharge. If home, recommend assist PRN. Pt would benefit from bariatric rolling walker for longer distances. Will sign off for acute PT. Recommend nursing encourage ambulation PRN and often with supervision  Treatment Diagnosis: impaired functional mobility 2/2 decreased endurance  PT Education: Goals;PT Role;Plan of Care;General Safety; Functional Mobility Training  Patient Education: Pt verbalized understanding  REQUIRES PT FOLLOW UP: No  Activity Tolerance  Activity Tolerance: Patient Tolerated treatment well;Patient limited by endurance     Patient Diagnosis(es): The primary encounter diagnosis was Syncope and collapse. Diagnoses of Shortness of breath, Adrenal nodule ([address] Tachycardia, and Acute respiratory failure with hypoxia ([address] were also pertinent to this visit. has no past medical history on file. has no past surgical history on file. Restrictions  Position Activity Restriction  Other position/activity restrictions: up with assist     Subjective   General  Chart Reviewed:  Yes  Additional Pertinent Hx: Pt is a 50 y.o. male adm Training, Stair training, Patient/Caregiver Education & Training, Safety Education & Training  Safety Devices  Type of devices: Bed alarm in place, Nurse notified, Call light within reach, Left in bed     Therapy Time   Individual Concurrent Group Co-treatment   Time In 0935         Time Out 1013         Minutes 38           Timed Code Treatment Minutes:38       Total Treatment Minutes:  1463 Bairon Cerda PT

## 2021-03-29 NOTE — DISCHARGE SUMMARY
about it, and trial life styl modification. Discussed Metformin. Consider at follow up if med mgt needed: will defer for now. Seen by nutrition.      He has an adrenal incidentaloma which will require further characterization  2.8 cm left adrenal nodule.  Per Radiol: \"favor adenoma, although not clearly characterized due to artifact. Consider adrenal protocol CT or chemical-shift adrenal MRI follow-up study\"   - Discussed with the patient  - Will need o/p f/u for this     Will set up with Resident's clinic as he has no PCP   To follow up 1 week after discharge with Cardiology, heart monitor placed. Additional findings or notes to primary provider:  See problem list above, thank you. Discharge Medications:   Current Discharge Medication List      START taking these medications    Details   metoprolol tartrate (LOPRESSOR) 25 MG tablet Take 0.5 tablets by mouth 2 times daily  Qty: 60 tablet, Refills: 3      furosemide (LASIX) 40 MG tablet Take 1 tablet by mouth daily  Qty: 60 tablet, Refills: 3      potassium chloride (KLOR-CON M) 20 MEQ extended release tablet Take 1 tablet by mouth daily  Qty: 30 tablet, Refills: 5      apixaban (ELIQUIS) 5 MG TABS tablet Take 1 tablet by mouth 2 times daily  Qty: 60 tablet, Refills: 1           Current Discharge Medication List        Current Discharge Medication List        Current Discharge Medication List          Discharge ROS:   A complete review of systems was asked and negative. Discharge Exam:    BP (!) 116/54   Pulse 71   Temp 97.9 °F (36.6 °C) (Oral)   Resp 18   Ht 6' 2\" (1.88 m)   Wt (!) 430 lb 1.9 oz (195.1 kg)   SpO2 95%   BMI 55.22 kg/m²   General appearance:  NAD  HEENT:   Normal cephalic, atraumatic, moist mucous membranes, no oropharyngeal erythema or exudate  Neck: Supple, trachea midline, no anterior cervical or SC LAD  Heart[de-identified] Normal s1/s2, RRR, no murmurs, gallops, or rubs.  No leg edema  Lungs:  Clear to auscultation bilaterally, no wheeze, rales or rhonchi, no use of accessory musclesNormal respiratory effort. Clear to auscultation, bilaterally without Rales/Wheezes/Rhonchi. Abdomen: Soft, non-tender, non-distended, bowel sounds present, no masses  Musculoskeletal:  No clubbing, no cyanosis, or edema  Skin: No lesion or masses  Neurologic:  Neurovascularly intact without any focal sensory/motor deficits. Cranial nerves: II-XII intact, grossly non-focal.  Psychiatric:  Alert and oriented, thought content appropriate    Labs: For convenience and continuity at follow-up the following most recent labs are provided:    Lab Results   Component Value Date    WBC 8.3 03/27/2021    HGB 14.2 03/27/2021    HCT 43.9 03/27/2021    MCV 87.3 03/27/2021     03/27/2021     03/28/2021    K 4.3 03/28/2021    K 3.9 03/23/2021    CL 96 03/28/2021    CO2 25 03/28/2021    BUN 25 03/28/2021    CREATININE 0.8 03/28/2021    CALCIUM 9.4 03/28/2021    ALKPHOS 85 03/23/2021    ALT 29 03/23/2021    AST 24 03/23/2021    BILITOT 0.3 03/23/2021    LABALBU 4.2 03/23/2021     No results found for: INR    Radiology:  Echo Complete    Result Date: 3/25/2021  Transthoracic Echocardiography Report (TTE)  Demographics   Patient Name       Abimbola Gordillo   Date of Study      03/25/2021         Gender              Male   Patient Number     [id number]         Date of Birth       1965   Visit Number       [id number]          Age                 50 year(s)   Accession Number   [id number]         Room Number         5303   Corporate ID       [id number]           GERMÁN HaleyT, RDCS   Ordering Physician Barb Stiles,   Bud        GISSELL Sanchez MD                 Physician           Betty Mcmahon MD  Procedure Type of Study   TTE procedure:ECHOCARDIOGRAM COMPLETE 2D W DOPPLER W COLOR.   Procedure Date Date: 03/25/2021 Start: 10:09 AM Study Location: [address] Echo Lab Technical Quality: Poor visualization due to body habitus. Indications:Syncope. Patient Status: Routine Contrast Medium: Definity. Amount - 2 ml Height: 74 inches Weight: 453.01 pounds BSA: 3.08 m2 BMI: 58.16 kg/m2 BP: 170/96 mmHg  Conclusions   Summary  Technically difficult examination. Left ventricular cavity size is normal.  There is moderate concentric left ventricular hypertrophy. Overall left  ventricular systolic function appears normal with an ejection fraction of  55-60%. No regional wall motion abnormalities are noted. Normal diastolic  function. Signature   ------------------------------------------------------------------  Electronically signed by Joel Goldstein MD  (Interpreting physician) on 03/25/2021 at 11:19 AM  ------------------------------------------------------------------   Findings   Left Ventricle  Left ventricular cavity size is normal. There is moderate concentric left  ventricular hypertrophy. Overall left ventricular systolic function appears  normal with an ejection fraction of 55-60%. No regional wall motion  abnormalities are noted. Normal diastolic function. Mitral Valve  The mitral valve leaflets appear normal in structure. No evidence of mitral  regurgitation. No evidence of mitral valve stenosis. Left Atrium  The left atrium is normal in size. Aortic Valve  The aortic valve appears structurally normal. No evidence of aortic valve  regurgitation. No evidence of aortic valve stenosis. Aorta  The aortic root is normal in size. Right Ventricle  The right ventricle is not well visualized. TAPSE measures 2.97 cm and the RVS velocity measures 11cm/s. RV systolic is  preserved on 2-D imaging. Tricuspid Valve  Tricuspid valve leaflets are structurally normal. No evidence of tricuspid  regurgitation. No evidence of tricuspid stenosis.    Right Atrium  The right atrial size is normal. head without contrast COMPARISON:  none. Technique: Multiple axial images were obtained of the head without IV contrast. Up-to-date CT equipment and radiation dose reduction techniques were employed. FINDINGS: The visualized paranasal sinuses, mastoid air cells, and middle ears are clear. The orbits and osseous structures are unremarkable. Intracranially, no mass, midline shift, or acute intracranial hemorrhage. The ventricles and cisternal spaces are appropriate. No focal hypodensity to suggest acute ischemia. 1. No acute intracranial abnormality. Ct Chest Pulmonary Embolism W Contrast    Result Date: 3/24/2021  Patient: Griselda Beecham  Time Out: 01:45 Exam(s): CTA CHEST With Contrast IV Amt: 80ml isovue 370  EXAM:   CT Angiography Chest With Intravenous Contrast  CLINICAL HISTORY:   SOB syncope, elevated ddimer. TECHNIQUE:   Axial computed tomographic angiography images of the chest with intravenous contrast.  CTDI is 37.98 mGy and DLP is 1060.09 mGy-cm. All CT scans at this facility use dose modulation, iterative reconstruction, and/or weight based dosing when appropriate to reduce radiation dose to as low as reasonably achievable. MIP reconstructed images were created and reviewed. Technologist note: pt sob,  current weight 453lb, pt had difficulty lying down and was unable to suspend respirations. COMPARISON:   No relevant prior studies available. FINDINGS:   Pulmonary arteries:  Limited evaluation of peripheral arteries due to patient body habitus/breathing motion artifact. No central pulmonary embolism. Aorta:  No acute findings. No thoracic aortic aneurysm. Lungs:  Mild basilar atelectasis. No mass. Pleural space:  Unremarkable. No significant effusion. No pneumothorax. Heart:  Coronary calcifications. No significant pericardial effusion. No evidence of RV dysfunction. Bones/joints:  Compression deformity of the T12 vertebral body and degenerative changes.   Age indeterminate, although favor chronic appearance. No dislocation. Soft tissues:  Unremarkable. Lymph nodes:  Unremarkable. No enlarged lymph nodes. Adrenals:  2.8 cm left adrenal nodule. Favor adenoma, although not clearly characterized due to artifact. Electronically signed by Mikey Aguiar M.D. on 03-24-21 at [address]    1. Limited evaluation. No central pulmonary embolism. 2.  2.8 cm left adrenal nodule. Favor adenoma, although not clearly characterized due to artifact. Consider adrenal protocol CT or chemical- shift adrenal MRI follow-up study. 3.  Compression deformity of the T12 vertebral body and degenerative changes. Age indeterminate, although favor chronic appearance. The patient was seen and examined on day of discharge and this discharge summary is in conjunction with any daily progress note from day of discharge. Time Spent on discharge is more than 30 minutes in the examination, evaluation, counseling and review of medications and discharge plan. SignedPersrinivasa Ambrocio DO   3/29/2021      Thank you No primary care provider on file. for the opportunity to be involved in this patient's care. If you have any questions or concerns please feel free to contact me at [facility].

## 2021-03-29 NOTE — PROGRESS NOTES
[facility]    Respiratory Therapy     Home Oxygen Evaluation        Name: Kaylin Peck  Medical Record Number: [id number]  Age: 49 y.o. Gender:  male   : 1965  Today's date: 3/29/2021  Room: [address] [place] [address]      Assessment        /70   Pulse 76   Temp 98 °F (36.7 °C) (Oral)   Resp 18   Ht 6' 2\" (1.88 m)   Wt (!) 430 lb 1.9 oz (195.1 kg)   SpO2 90%   BMI 55.22 kg/m²     Patient Active Problem List   Diagnosis    Plantar fasciitis, left    Chest pain    Acute respiratory failure with hypoxia (HCC)    Syncope and collapse    Atrial flutter (HCC)    Obesity       Social History:  Social History     Tobacco Use    Smoking status: Never Smoker   Substance Use Topics    Alcohol use: Yes     Comment: rare    Drug use: Never       Patient Room Air saturation at rest 86 %  Patient Room Air saturation upon ambulation 85 %    Oxygen saturations of 88% or less on RA qualifies patient for Home Oxygen    Patient resting on 2  lmp  with an oxygen saturation of  88 %     Patient ambulated on 3 lpm with an oxygen saturation of 92%    Qualifying patient for home oxygen with ambulation and continuous flow  @ 3 lpm.      In your clinical assessment does the Patient Require Portable Oxygen Tanks?     Yes               Patient/caregiver was educated on Home Oxygen process:  Yes      Level of patient/caregiver understanding able to:   [x] Verbalize understanding   [] Demonstrate understanding       [] Teach back        [] Needs reinforcement        []  No available caregiver               []  Other:     Response to education:  Good     Time Spent with Home O2 Set Up:  30  minutes     Og Espino RCP on 3/29/2021 at 8:35 AM                 .

## 2021-03-29 NOTE — PROGRESS NOTES
Pt given discharge instructions and prescriptions, had no further questions. Iv removed. No complication.

## 2021-04-01 LAB
ALDOSTERONE/RENIN ACTIVITY CALCULATION: 4.8 RATIO
ALDOSTERONE: 3.9 NG/DL
RENIN ACTIVITY: 0.8 NG/ML/HR

## 2021-04-02 LAB
ALDOSTERONE/RENIN ACTIVITY CALCULATION: 5.2 RATIO
ALDOSTERONE: 3.1 NG/DL
RENIN ACTIVITY: 0.6 NG/ML/HR

## 2021-05-10 NOTE — PROGRESS NOTES
[facility]   Electrophysiology  Office Visit  Date: 5/11/2021    Chief Complaint   Patient presents with    Loss of Consciousness    Atrial Fibrillation    Atrial Flutter    Bradycardia    Hypertension       Cardiac HX: Angela Lyle is a 49 y.o. man with a h/o HTN, morbid obesity and syncope who p/w SOB and syncope (3/25/2021) and found to be in acute on chronic  dCHF and AF/AFL, spontaneously converted, not on [address] d/y low risk, O2 dependent. Interval History/HPI: Patient is here for follow-up for syncope and AF/AFL. Patient states that he had another syncopal episode on Sunday. He had been in the shower when he found himself on his knees at the bottom of the shower. He does not remember falling or passing out. He thinks he may have only had a couple coffee that day and was not drinking very much fluid. He states he is not wearing his oxygen at home as it was delivered originally downstairs to his mother's apartment and not his apartment. He states that the oxygen tanks are too heavy for him to get up the stairs himself. He will wear it if he staying at his mother's. His pulse ox in the office today at rest is 92%. He was not ambulated in the hallway. He has not felt any heart racing or palpitations. He did leave the hospital with a monitor on and states that it only lasted for about an hour before it fell off. He states he attempted to tape it down however that did not work either. He states he had another one and wore it for about 6 hours and that was it. He did run out of his medications however he states that he finally got them refilled and is back on all of them. He is only been taking the Eliquis once a day as he states that about 15 minutes after he takes the medication he feels dizzy and lightheaded. This is not happened with every dose however he states it happens frequently while taking this medication.   He states he is up all night going to the bathroom was taking 40 mg of Lasix a day. He does have 2-3+ bilateral lower extremity pitting edema in the office today. He has not had a sleep study in the past and is not sure he would be compliant with the mask. He would like to see somebody who would consider alternate therapies. He denies chest pain, PND, orthopnea. Home medications:   No current outpatient medications on file prior to visit. No current facility-administered medications on file prior to visit. No past medical history on file. No past surgical history on file. Allergies   Allergen Reactions    Cats Claw (Uncaria Tomentosa) Hives       Social History:  Reviewed. reports that he has never smoked. He does not have any smokeless tobacco history on file. He reports current alcohol use. He reports that he does not use drugs. Family History:  Reviewed. family history is not on file. Review of System:    · Constitutional: No fevers, chills. · Eyes: No visual changes or diplopia. No scleral icterus. · ENT: No Headaches. No mouth sores or sore throat. · Cardiovascular: No for chest pain, Yes for dyspnea on exertion, No for palpitations or Yes for loss of consciousness. No cough, hemoptysis, No for pleuritic pain, or phlebitis. · Respiratory: No for cough or wheezing. No hematemesis. · Gastrointestinal: No abdominal pain, blood in stools. · Genitourinary: No dysuria, or hematuria. · Musculoskeletal: No gait disturbance,    · Integumentary: No rash or pruritis. · Neurological: No headache, change in muscle strength, numbness or tingling. · Psychiatric: No anxiety, or depression. · Endocrine: No temperature intolerance. No excessive thirst, fluid intake, or urination. · Hem/Lymph: No abnormal bruising or bleeding, blood clots or swollen lymph nodes. · Allergic/Immunologic: No nasal congestion or hives.     Physical Examination:  Vitals:    05/11/21 1537   BP: 114/72   Pulse: 76         Wt Readings from Last 3 Encounters: 05/11/21 (!) 434 lb 9.6 oz (197.1 kg)   03/29/21 (!) 430 lb 1.9 oz (195.1 kg)       · Constitutional: Oriented. No distress. · Head: Normocephalic and atraumatic. · Mouth/Throat: Oropharynx is clear and moist.   · Eyes: Conjunctivae clear without jaunduice. PERRL. · Neck: Neck supple. No rigidity. No JVD present. · Cardiovascular: Normal rate, regular rhythm, S1&S2. · Pulmonary/Chest: Bilateral respiratory sounds. No wheezes, No rhonchi. · Abdominal: Soft. Bowel sounds present. No distension, No tenderness. · Musculoskeletal: No tenderness. No edema    · Lymphadenopathy: Has no cervical adenopathy. · Neurological: Alert and oriented. Cranial nerve appears intact, No Gross deficit   · Skin: Skin is warm and dry. No rash noted. · Psychiatric: Has a normal mood, affect and behavior     Labs:  Reviewed. No results for input(s): NA, K, CL, CO2, PHOS, BUN, CREATININE in the last 72 hours. Invalid input(s): CA,  TSH  No results for input(s): WBC, HGB, HCT, MCV, PLT in the last 72 hours. Lab Results   Component Value Date    TROPONINI <0.01 03/29/2021     No results found for: BNP  No results found for: PROTIME, INR  No results found for: CHOL, HDL, TRIG    ECG: Personally reviewed: NSR, HR 76, , , QTc 448, RBBB    ECHO:  3.25.2021  Summary   Technically difficult examination. Left ventricular cavity size is normal.   There is moderate concentric left ventricular hypertrophy. Overall left   ventricular systolic function appears normal with an ejection fraction of   55-60%. No regional wall motion abnormalities are noted. Normal diastolic   function.     Stress Test: N/A    Cardiac Angiography: N/A    Problem List:   Patient Active Problem List    Diagnosis Date Noted    Syncope and collapse 03/25/2021    Atrial flutter ([address] 03/25/2021    Obesity 03/25/2021    Chest pain 03/24/2021    Acute respiratory failure with hypoxia ([address] 03/24/2021    Plantar fasciitis, left 04/07/2017 Assessment:   1. Atrial flutter, unspecified type ([address]    2. Paroxysmal atrial fibrillation ([address]    3. Syncope and collapse    4. Sinus bradycardia    5. Essential hypertension         Cardiac HX: Analia Brambila is a 49 y.o. man with a h/o HTN, morbid obesity and syncope who p/w SOB and syncope (3/25/2021) and found to be in acute on chronic  dCHF and AF/AFL, spontaneously converted. [id number] 2. TSH 1.04 (3/24/2021). AF  - In NSR - had been asymptomatic with AFL in hospital other than syncopal event  - On Eliquis 5 mg - only taking once a day  - Will change to Xarelto 20 mg daily  - On metoprolol 12.5 mg BID - low HR , only taking once a day will change to Toprol-XL 12.5 mg daily  - Sleep study outpatient  - Echo - LA 4.3/47.7, normal EF  - 2 week Cam  - F/u with Dr. Jeff Garza in 6-8 weeks for rhythm managment     Syncope  - Episode on Sunday  - 2 week CAM  - Lifetyle modification - diet, exercise and weight loss, hydration  - ECG ordered and results personally reviewed     dCHF  - Appears to be a little fluid overload  - On lasix 40 mg QD, potassium 20 mEq daily  - Check BMP  - On chronic O2 - not compiant  - Will have patient see Dr. Marylouise Brunner      EF of 40-22%  ACEi for systolic HF  ASA and Statin for CAD  Anticoagulation for AF and heart failure  No Tobacco use. All questions and concerns were addressed to the patient/family. Alternatives to my treatment were discussed. The note was completed using EMR. Every effort was made to ensure accuracy; however, inadvertent computerized transcription errors may be present. Patient received education regarding their diagnosis, treatment and medications while in the office today.       Rise Lopez [address]

## 2021-05-11 ENCOUNTER — OFFICE VISIT (OUTPATIENT)
Dept: CARDIOLOGY CLINIC | Age: 56
End: 2021-05-11
Payer: MEDICAID

## 2021-05-11 VITALS
SYSTOLIC BLOOD PRESSURE: 114 MMHG | DIASTOLIC BLOOD PRESSURE: 72 MMHG | HEIGHT: 75 IN | BODY MASS INDEX: 39.17 KG/M2 | WEIGHT: 315 LBS | HEART RATE: 76 BPM

## 2021-05-11 DIAGNOSIS — R55 SYNCOPE AND COLLAPSE: ICD-10-CM

## 2021-05-11 DIAGNOSIS — I48.0 PAROXYSMAL ATRIAL FIBRILLATION (HCC): ICD-10-CM

## 2021-05-11 DIAGNOSIS — R00.1 SINUS BRADYCARDIA: ICD-10-CM

## 2021-05-11 DIAGNOSIS — I48.92 ATRIAL FLUTTER, UNSPECIFIED TYPE (HCC): Primary | ICD-10-CM

## 2021-05-11 DIAGNOSIS — I10 ESSENTIAL HYPERTENSION: ICD-10-CM

## 2021-05-11 PROCEDURE — 93246 EXT ECG>7D<15D RECORDING: CPT | Performed by: INTERNAL MEDICINE

## 2021-05-11 PROCEDURE — G8427 DOCREV CUR MEDS BY ELIG CLIN: HCPCS | Performed by: NURSE PRACTITIONER

## 2021-05-11 PROCEDURE — G8417 CALC BMI ABV UP PARAM F/U: HCPCS | Performed by: NURSE PRACTITIONER

## 2021-05-11 PROCEDURE — 3017F COLORECTAL CA SCREEN DOC REV: CPT | Performed by: NURSE PRACTITIONER

## 2021-05-11 PROCEDURE — 93000 ELECTROCARDIOGRAM COMPLETE: CPT | Performed by: NURSE PRACTITIONER

## 2021-05-11 PROCEDURE — 99214 OFFICE O/P EST MOD 30 MIN: CPT | Performed by: NURSE PRACTITIONER

## 2021-05-11 PROCEDURE — 4004F PT TOBACCO SCREEN RCVD TLK: CPT | Performed by: NURSE PRACTITIONER

## 2021-05-11 RX ORDER — FUROSEMIDE 40 MG/1
40 TABLET ORAL DAILY
Qty: 90 TABLET | Refills: 3 | Status: SHIPPED | OUTPATIENT
Start: 2021-05-11 | End: 2021-07-02

## 2021-05-11 RX ORDER — POTASSIUM CHLORIDE 20 MEQ/1
20 TABLET, EXTENDED RELEASE ORAL DAILY
Qty: 90 TABLET | Refills: 3 | Status: SHIPPED | OUTPATIENT
Start: 2021-05-11 | End: 2021-07-02

## 2021-05-11 RX ORDER — METOPROLOL SUCCINATE 25 MG/1
12.5 TABLET, EXTENDED RELEASE ORAL DAILY
Qty: 90 TABLET | Refills: 3 | Status: SHIPPED | OUTPATIENT
Start: 2021-05-11 | End: 2021-06-02

## 2021-05-11 SDOH — HEALTH STABILITY: MENTAL HEALTH: HOW OFTEN DO YOU HAVE A DRINK CONTAINING ALCOHOL?: MONTHLY OR LESS

## 2021-06-01 ENCOUNTER — TELEPHONE (OUTPATIENT)
Dept: CARDIOLOGY CLINIC | Age: 56
End: 2021-06-01

## 2021-06-01 NOTE — TELEPHONE ENCOUNTER
Patient returning call from [facility] team regarding a message left on voicemail the patient sent 14 cam (patient not sure type of device  ) last Friday please advise

## 2021-06-02 ENCOUNTER — OFFICE VISIT (OUTPATIENT)
Dept: CARDIOLOGY CLINIC | Age: 56
End: 2021-06-02
Payer: MEDICAID

## 2021-06-02 VITALS
HEART RATE: 72 BPM | BODY MASS INDEX: 53.1 KG/M2 | SYSTOLIC BLOOD PRESSURE: 130 MMHG | WEIGHT: 315 LBS | DIASTOLIC BLOOD PRESSURE: 88 MMHG

## 2021-06-02 DIAGNOSIS — I48.92 ATRIAL FLUTTER, UNSPECIFIED TYPE (HCC): ICD-10-CM

## 2021-06-02 DIAGNOSIS — I48.0 PAROXYSMAL ATRIAL FIBRILLATION (HCC): ICD-10-CM

## 2021-06-02 DIAGNOSIS — I50.32 CHRONIC HEART FAILURE WITH PRESERVED EJECTION FRACTION (HFPEF) (HCC): Primary | ICD-10-CM

## 2021-06-02 PROCEDURE — 99205 OFFICE O/P NEW HI 60 MIN: CPT | Performed by: INTERNAL MEDICINE

## 2021-06-02 PROCEDURE — G8417 CALC BMI ABV UP PARAM F/U: HCPCS | Performed by: INTERNAL MEDICINE

## 2021-06-02 PROCEDURE — G8427 DOCREV CUR MEDS BY ELIG CLIN: HCPCS | Performed by: INTERNAL MEDICINE

## 2021-06-02 RX ORDER — METOPROLOL SUCCINATE 25 MG/1
25 TABLET, EXTENDED RELEASE ORAL DAILY
Qty: 90 TABLET | Refills: 3 | Status: SHIPPED | OUTPATIENT
Start: 2021-06-02 | End: 2022-07-06

## 2021-06-02 NOTE — PROGRESS NOTES
Cc: HFpEF, PAF, morbid obesity    HPI:     Patient is a 50 yo man with h/o morbid obesity (BMI 53), likely MICH and OHS on prn supplemental oxygen 2-3 liters, HTN, HLP, DM type II, HFpEF, PAF. Patient was admitted at [facility] 3/23 - 3/29/21 due to syncope, AHF and PAF with CVR. Apparently his HR in afib ranged 30s (at night) to 130's during the day. Echo 3/25/21: moderate LVH, EF 55-60%, normal RV and valves. Patient is here to establish care with me for his HF. He reports mild exertional dyspnea with moderate exertion, admits to davina leg edema. He does not check his vital signs or weights at home. Histories     Past Medical History:   has no past medical history on file. Surgical History:   has no past surgical history on file. Social History:   reports that he has never smoked. He has never used smokeless tobacco. He reports current alcohol use. He reports that he does not use drugs. Family History:  No evidence for sudden cardiac death or premature CAD      Medications:     Home medications were reviewed and are listed below    Prior to Admission medications    Medication Sig Start Date End Date Taking? Authorizing Provider   metoprolol succinate (TOPROL XL) 25 MG extended release tablet Take 1 tablet by mouth daily 6/2/21  Yes Nandini Peterson MD   rivaroxaban (XARELTO) 20 MG TABS tablet Take 1 tablet by mouth daily (with breakfast) 5/11/21  Yes Roberta Acosta APRN - CNP   furosemide (LASIX) 40 MG tablet Take 1 tablet by mouth daily 5/11/21  Yes Roberta Acosta APRN - CNP   potassium chloride (KLOR-CON M) 20 MEQ extended release tablet Take 1 tablet by mouth daily 5/11/21  Yes Roberta Papa, APRN - CNP          Allergy:     Cats claw (uncaria tomentosa)       Review of Systems:     All 12 point review of symptoms completed. Pertinent positives identified in the HPI, all other review of symptoms negative as below. CONSTITUTIONAL: No fatigue  SKIN: No rash or pruritis.   EYES: No visual changes or diplopia. No scleral icterus. ENT: No Headaches, hearing loss or vertigo. No mouth sores or sore throat. CARDIOVASCULAR: No chest pain/chest pressure/chest discomfort. No palpitations. + edema. RESPIRATORY: No dyspnea. No cough or wheezing, no sputum production. GASTROINTESTINAL: No N/V/D. No abdominal pain, appetite loss, blood in stools. GENITOURINARY: No dysuria, trouble voiding, or hematuria. MUSCULOSKELETAL:  No gait disturbance, weakness or joint complaints. NEUROLOGICAL: No headache, diplopia, change in muscle strength, numbness or tingling. No change in gait, balance, coordination, mood, affect, memory, mentation, behavior. PSHYCH: No anxiety, loss of interest, change in sexual behavior, feelings of self-harm, or confusion. ENDOCRINE: No excessive thirst, fluid intake, or urination. No tremor. HEMATOLOGIC: No abnormal bruising or bleeding. ALLERGY: No nasal congestion or hives.       Physical Examination:     Vitals:    06/02/21 1058 06/02/21 1105   BP: (!) 136/96 130/88   Pulse: 72    Weight: (!) 424 lb 12.8 oz (192.7 kg)        Wt Readings from Last 3 Encounters:   06/02/21 (!) 424 lb 12.8 oz (192.7 kg)   05/11/21 (!) 434 lb 9.6 oz (197.1 kg)   03/29/21 (!) 430 lb 1.9 oz (195.1 kg)         General Appearance:  Alert, cooperative, no distress, appears stated age Appropriate weight   Head:  Normocephalic, without obvious abnormality, atraumatic   Eyes:  PERRL, conjunctiva/corneas clear EOM intact  Ears normal   Throat no lesions       Nose: Nares normal, no drainage or sinus tenderness   Throat: Lips, mucosa, and tongue normal   Neck: Supple, symmetrical, trachea midline, no adenopathy, thyroid: not enlarged, symmetric, no tenderness/mass/nodules, no carotid bruit       Lungs:   Clear to auscultation bilaterally, respirations unlabored   Chest Wall:  No tenderness or deformity   Heart:  Regular rhythm, rate is controlled, S1, S2 normal, there is no murmur, there is no rub or gallop, cannot assess jvd, 2+ bilateral lower extremity edema   Abdomen:   Soft, non-tender, bowel sounds active all four quadrants,  no masses, no organomegaly       Extremities: Extremities normal, atraumatic, no cyanosis   Pulses: 2+ and symmetric   Skin: Skin color, texture, turgor normal, no rashes or lesions   Pysch: Normal mood and affect   Neurologic: Normal gross motor and sensory exam.  Cranial nerves intact        Labs:     Lab Results   Component Value Date    WBC 8.3 03/27/2021    HGB 14.2 03/27/2021    HCT 43.9 03/27/2021    MCV 87.3 03/27/2021     03/27/2021     Lab Results   Component Value Date     03/28/2021    K 4.3 03/28/2021    CL 96 (L) 03/28/2021    CO2 25 03/28/2021    BUN 25 (H) 03/28/2021    CREATININE 0.8 (L) 03/28/2021    GLUCOSE 124 (H) 03/28/2021    CALCIUM 9.4 03/28/2021    PROT 8.6 (H) 03/23/2021    LABALBU 4.2 03/23/2021    BILITOT 0.3 03/23/2021    ALKPHOS 85 03/23/2021    AST 24 03/23/2021    ALT 29 03/23/2021    LABGLOM >60 03/28/2021    GFRAA >60 03/28/2021    AGRATIO 1.0 (L) 03/23/2021    GLOB 4.4 03/23/2021         No results found for: CHOL  No results found for: TRIG  No results found for: HDL  No results found for: LDLCHOLESTEROL, LDLCALC  No results found for: LABVLDL, VLDL  No results found for: CHOLHDLRATIO    No results found for: INR, PROTIME    The ASCVD Risk score (Otf Burt, et al., 2013) failed to calculate for the following reasons:    Cannot find a previous HDL lab    Cannot find a previous total cholesterol lab      Assessment / Plan:      Diagnosis Orders   1. Chronic heart failure with preserved ejection fraction (HFpEF) ([facility])     2. Paroxysmal atrial fibrillation ([address]          1. Acute on chronic HFpEF:   It is most likely due to HTN, likely untreated MICH, morbid obesity. He appears mildly volume overloaded but hemo stable. -Will continue with lasix 40 daily and KDur 20 daily.  Monitor leg edema and/or weights, low threshold to increase lasix to 40 bid (and doubling KDur)  -Low salt diet    2. PAF:   Patient remains in sinus.     -Cw Toprol 25 daily and Xarelto  -Sleep study pending  -Normal TSH in 03/2021    3. Morbid obesity:   I have counseled patient to increase his level of activity, change his diet. If patient is interested in bariatric surgery, I could refer him to Dr Karma Hill. 4. HTN:   BP is controlled with current meds. Return in about 4 weeks (around 6/30/2021). I have spent 80 minutes of face to face time with the patient with more than 50% spent counseling and coordinating care. I have personally reviewed the reports and images of labs, radiological studies, cardiac studies including ECG's and telemetry, current and old medical records. The note was completed using EMR and Dragon dictation system. Every effort was made to ensure accuracy; however, inadvertent computerized transcription errors may be present. All questions and concerns were addressed to the patient/family. Alternatives to my treatment were discussed. I would like to thank you for providing me the opportunity to participate in the care of your patient. If you have any questions, please do not hesitate to contact me.      Kymberly Leone MD, [facility] - [address] [place] [address] Phone: [phone]  Heart Failure Hotline: [phone]  Fax: [phone]

## 2021-06-02 NOTE — TELEPHONE ENCOUNTER
Informed pt GEB would like pt to keep his appt for today to discuss the CHF diagnosis. Pt verbalized understanding.

## 2021-06-02 NOTE — TELEPHONE ENCOUNTER
Spoke with pt, pt reports he turned the 14 day Cam in last Friday. Pt would like to know if LEVI would like him to re-schedule his appt for today until the heart monitor results are back.

## 2021-06-03 ENCOUNTER — TELEPHONE (OUTPATIENT)
Dept: CARDIOLOGY CLINIC | Age: 56
End: 2021-06-03

## 2021-06-03 DIAGNOSIS — R00.2 PALPITATION: ICD-10-CM

## 2021-06-03 PROCEDURE — 93248 EXT ECG>7D<15D REV&INTERPJ: CPT | Performed by: INTERNAL MEDICINE

## 2021-06-03 NOTE — TELEPHONE ENCOUNTER
Patient called back. He will be having his mom pick him up. She will be taking him to [facility] ER.

## 2021-06-03 NOTE — TELEPHONE ENCOUNTER
Hamzah with [facility] [phone]  Calling in with a critical ekg  9.2 second pause and 350 other pauses.

## 2021-07-02 ENCOUNTER — OFFICE VISIT (OUTPATIENT)
Dept: CARDIOLOGY CLINIC | Age: 56
End: 2021-07-02
Payer: MEDICAID

## 2021-07-02 VITALS
SYSTOLIC BLOOD PRESSURE: 140 MMHG | BODY MASS INDEX: 54.1 KG/M2 | WEIGHT: 315 LBS | HEART RATE: 72 BPM | DIASTOLIC BLOOD PRESSURE: 76 MMHG

## 2021-07-02 DIAGNOSIS — I50.32 CHRONIC HEART FAILURE WITH PRESERVED EJECTION FRACTION (HFPEF) (HCC): Primary | ICD-10-CM

## 2021-07-02 PROCEDURE — G8428 CUR MEDS NOT DOCUMENT: HCPCS | Performed by: INTERNAL MEDICINE

## 2021-07-02 PROCEDURE — 1036F TOBACCO NON-USER: CPT | Performed by: INTERNAL MEDICINE

## 2021-07-02 PROCEDURE — G8417 CALC BMI ABV UP PARAM F/U: HCPCS | Performed by: INTERNAL MEDICINE

## 2021-07-02 PROCEDURE — 99215 OFFICE O/P EST HI 40 MIN: CPT | Performed by: INTERNAL MEDICINE

## 2021-07-02 PROCEDURE — 3017F COLORECTAL CA SCREEN DOC REV: CPT | Performed by: INTERNAL MEDICINE

## 2021-07-02 RX ORDER — POTASSIUM CHLORIDE 20 MEQ/1
20 TABLET, EXTENDED RELEASE ORAL 2 TIMES DAILY
Qty: 180 TABLET | Refills: 3 | Status: SHIPPED | OUTPATIENT
Start: 2021-07-02 | End: 2022-07-21

## 2021-07-02 RX ORDER — FUROSEMIDE 40 MG/1
40 TABLET ORAL 2 TIMES DAILY
Qty: 180 TABLET | Refills: 3 | Status: SHIPPED | OUTPATIENT
Start: 2021-07-02 | End: 2022-08-01 | Stop reason: SDUPTHER

## 2021-07-02 NOTE — PROGRESS NOTES
Cc: HFpEF, PAF, SSS s/p pacer, morbid obesity    HPI:     Patient is a 50 yo man with h/o morbid obesity (BMI 53), likely MICH and OHS on prn supplemental oxygen 2-3 liters, HTN, HLP, DM type II, HFpEF, PAF, recurrent syncopes, SSS s/p pacer. Patient was admitted at [facility] 03/2021 for syncope, AHF and PAF with CVR. Echo 3/25/21: moderate LVH, EF 55-60%, normal RV and valves. CAM monitor 5/11 - 5/26/21: 350 pauses with longest being 9.2 secs. Patient was instructed to call EMS and go to the nearest ED, went to [place] [address]. Admitted 6/3 - 6/5/21, had a BSI dual chamber pacer placed by Dr Cranston Najjar 6/4/21. The next am, the atrial lead was found to be displaced but he was released home to come back on 6/22/21 for revision of pocket and repositioning of RV and RA leads. Patient now has two incisions.      Patient is here for a follow up. He reports no more dizzy spells. He has mild drainage from the initial incision but no erythema or purulent discharge, no bleeding. He admits to some excess salt in his diet and his legs are mildly edematous. Histories     Past Medical History:   has no past medical history on file. Surgical History:   has no past surgical history on file. Social History:   reports that he has never smoked. He has never used smokeless tobacco. He reports current alcohol use. He reports that he does not use drugs. Family History:  No evidence for sudden cardiac death or premature CAD      Medications:     Home medications were reviewed and are listed below    Prior to Admission medications    Medication Sig Start Date End Date Taking?  Authorizing Provider   furosemide (LASIX) 40 MG tablet Take 1 tablet by mouth 2 times daily 7/2/21  Yes Alvino Grimaldo MD   potassium chloride (KLOR-CON M) 20 MEQ extended release tablet Take 1 tablet by mouth 2 times daily 7/2/21  Yes Alvino Grimaldo MD   metoprolol succinate (TOPROL XL) 25 MG extended release tablet Take 1 tablet by mouth daily 6/2/21 Yes Bonnie Alexander MD   rivaroxaban Huang Garza) 20 MG TABS tablet Take 1 tablet by mouth daily (with breakfast) 5/11/21  Yes Kemi Inman, APRN - CNP          Allergy:     Cats claw (uncaria tomentosa)       Review of Systems:     All 12 point review of symptoms completed. Pertinent positives identified in the HPI, all other review of symptoms negative as below. CONSTITUTIONAL: No fatigue  SKIN: No rash or pruritis. EYES: No visual changes or diplopia. No scleral icterus. ENT: No Headaches, hearing loss or vertigo. No mouth sores or sore throat. CARDIOVASCULAR: No chest pain/chest pressure/chest discomfort. No palpitations. + edema. RESPIRATORY: No dyspnea. No cough or wheezing, no sputum production. GASTROINTESTINAL: No N/V/D. No abdominal pain, appetite loss, blood in stools. GENITOURINARY: No dysuria, trouble voiding, or hematuria. MUSCULOSKELETAL:  No gait disturbance, weakness or joint complaints. NEUROLOGICAL: No headache, diplopia, change in muscle strength, numbness or tingling. No change in gait, balance, coordination, mood, affect, memory, mentation, behavior. PSHYCH: No anxiety, loss of interest, change in sexual behavior, feelings of self-harm, or confusion. ENDOCRINE: No excessive thirst, fluid intake, or urination. No tremor. HEMATOLOGIC: No abnormal bruising or bleeding. ALLERGY: No nasal congestion or hives.       Physical Examination:     Vitals:    07/02/21 1445 07/02/21 1449   BP: (!) 140/76 (!) 140/76   Pulse: 72    Weight: (!) 432 lb 12.8 oz (196.3 kg)        Wt Readings from Last 3 Encounters:   07/02/21 (!) 432 lb 12.8 oz (196.3 kg)   06/02/21 (!) 424 lb 12.8 oz (192.7 kg)   05/11/21 (!) 434 lb 9.6 oz (197.1 kg)         General Appearance:  Alert, cooperative, no distress, appears stated age Appropriate weight   Head:  Normocephalic, without obvious abnormality, atraumatic   Eyes:  PERRL, conjunctiva/corneas clear EOM intact  Ears normal   Throat no lesions       Nose: Nares normal, no drainage or sinus tenderness   Throat: Lips, mucosa, and tongue normal   Neck: Supple, symmetrical, trachea midline, no adenopathy, thyroid: not enlarged, symmetric, no tenderness/mass/nodules, no carotid bruit       Lungs:   Clear to auscultation bilaterally, respirations unlabored   Chest Wall:  No tenderness or deformity; two incisions are clean and with no erythema, lower incision with some clear drainage.     Heart:  Regular rhythm, rate is controlled, S1, S2 normal, there is no murmur, there is no rub or gallop, no jvd, 1-2+ bilateral lower extremity edema   Abdomen:   Soft, non-tender, bowel sounds active all four quadrants,  no masses, no organomegaly       Extremities: Extremities normal, atraumatic, no cyanosis   Pulses: 2+ and symmetric   Skin: Skin color, texture, turgor normal, no rashes or lesions   Pysch: Normal mood and affect   Neurologic: Normal gross motor and sensory exam.  Cranial nerves intact        Labs:     Lab Results   Component Value Date    WBC 8.3 03/27/2021    HGB 14.2 03/27/2021    HCT 43.9 03/27/2021    MCV 87.3 03/27/2021     03/27/2021     Lab Results   Component Value Date     03/28/2021    K 4.3 03/28/2021    CL 96 (L) 03/28/2021    CO2 25 03/28/2021    BUN 25 (H) 03/28/2021    CREATININE 0.8 (L) 03/28/2021    GLUCOSE 124 (H) 03/28/2021    CALCIUM 9.4 03/28/2021    PROT 8.6 (H) 03/23/2021    LABALBU 4.2 03/23/2021    BILITOT 0.3 03/23/2021    ALKPHOS 85 03/23/2021    AST 24 03/23/2021    ALT 29 03/23/2021    LABGLOM >60 03/28/2021    GFRAA >60 03/28/2021    AGRATIO 1.0 (L) 03/23/2021    GLOB 4.4 03/23/2021         No results found for: CHOL  No results found for: TRIG  No results found for: HDL  No results found for: LDLCHOLESTEROL, LDLCALC  No results found for: LABVLDL, VLDL  No results found for: CHOLHDLRATIO    No results found for: INR, PROTIME    The ASCVD Risk score (Nela Sharp, et al., 2013) failed to calculate for the following reasons:    Cannot

## 2021-07-07 NOTE — PROGRESS NOTES
[address]   Electrophysiology  Office Visit  Date: 7/12/2021    Chief Complaint   Patient presents with    Atrial Fibrillation    Edema    Congestive Heart Failure    Bradycardia    Dizziness       Cardiac HX: Annamarie Puckett is a 49 y.o. man with a h/o HTN, morbid obesity and syncope who p/w SOB and syncope (3/25/2021) and found to be in acute on chronic  dCHF and AF/AFL, spontaneously converted, O2 dependent, 14 day monitor (5/11/21- 5/26/21) showed avg HR 71 (), 350 pauses, longest 9.2 seconds, SB, 6 episodes AT, referred pt to ER, s/p dual ch PPM (6/4/21, Dr. Mele Parr), atrial lead displaced, s/p lead revision (6/22/21, Dr. Mele Parr)    Interval History/HPI: Patient is here for follow-up for syncope, SSS, PPM mgmnt and AF/AFL. Pt had ongoing episodes of syncope, outpatient monitor (5/11/21- 5/26/21)  showed 350 pauses with the longest being 9.2 seconds in length. Patient was advised to go to ER. He presented to [facility] and underwent a dual-chamber PPM (6/4/21), the next day, it was noted his atrial lead was displaced, and a subsequent lead revision (6/22/21). Device check today shows 19% AP, 1% , no AT/AF noted, no other arrhythmias noted, all other parameters stable. L chest incisions healing well. Inferior incision w/ pinpoint opening at lateral edge, no drainage noted. No further syncopal events. Pt on Xarelto, no s/sx bleeding, continue. Denies palpitations, heart racing, lightheadedness. C/o occasional dizziness in the mornings after his medications that lasts for 30 seconds. C/o ongoing fatigue, frequent naps, PND, orthopnea, has sleep study scheduled for Sunday and follows w/ pulmonology next month. BP well controlled in office 118/76. No CP. Has 3+ BLE edema and HARMON. Lasix dose was doubled last week by Dr. David Stevenson however patient has not started taking the extra dose yet. Pt states he does not exercise or adhere to low sodium diet.     Home medications:   Current Outpatient Medications on File Prior to Visit   Medication Sig Dispense Refill    furosemide (LASIX) 40 MG tablet Take 1 tablet by mouth 2 times daily 180 tablet 3    potassium chloride (KLOR-CON M) 20 MEQ extended release tablet Take 1 tablet by mouth 2 times daily 180 tablet 3    metoprolol succinate (TOPROL XL) 25 MG extended release tablet Take 1 tablet by mouth daily 90 tablet 3    rivaroxaban (XARELTO) 20 MG TABS tablet Take 1 tablet by mouth daily (with breakfast) 90 tablet 3     No current facility-administered medications on file prior to visit. No past medical history on file. No past surgical history on file. Allergies   Allergen Reactions    Cats Claw (Uncaria Tomentosa) Hives       Social History:  Reviewed. reports that he has never smoked. He has never used smokeless tobacco. He reports current alcohol use. He reports that he does not use drugs. Family History:  Reviewed. family history is not on file. Review of System:    · Constitutional: No fevers, chills. · Eyes: No visual changes or diplopia. No scleral icterus. · ENT: No Headaches. No mouth sores or sore throat. · Cardiovascular: No for chest pain, Yes for dyspnea on exertion, No for palpitations or Yes for loss of consciousness. No cough, hemoptysis, No for pleuritic pain, or phlebitis. · Respiratory: No for cough or wheezing. No hematemesis. · Gastrointestinal: No abdominal pain, blood in stools. · Genitourinary: No dysuria, or hematuria. · Musculoskeletal: No gait disturbance,    · Integumentary: No rash or pruritis. · Neurological: No headache, change in muscle strength, numbness or tingling. · Psychiatric: No anxiety, or depression. · Endocrine: No temperature intolerance. No excessive thirst, fluid intake, or urination. · Hem/Lymph: No abnormal bruising or bleeding, blood clots or swollen lymph nodes. · Allergic/Immunologic: No nasal congestion or hives.     Physical Examination:  Vitals: 07/12/21 1053   BP: 118/76   Pulse: 65         Wt Readings from Last 3 Encounters:   07/12/21 (!) 434 lb 6.4 oz (197 kg)   07/02/21 (!) 432 lb 12.8 oz (196.3 kg)   06/02/21 (!) 424 lb 12.8 oz (192.7 kg)       · Constitutional: Oriented. No distress. · Head: Normocephalic and atraumatic. · Mouth/Throat: Oropharynx is clear and moist.   · Eyes: Conjunctivae clear without jaunduice. PERRL. · Neck: Neck supple. No rigidity. No JVD present. · Cardiovascular: Normal rate, regular rhythm, S1&S2. · Pulmonary/Chest: Bilateral respiratory sounds. No wheezes, No rhonchi. · Abdominal: Soft. Bowel sounds present. No distension, No tenderness. · Musculoskeletal: No tenderness. 3+ BLE edema    · Lymphadenopathy: Has no cervical adenopathy. · Neurological: Alert and oriented. Cranial nerve appears intact, No Gross deficit   · Skin: Skin is warm and dry. No rash noted. Inferior left chest incision w/ lateral pinpoint opening, no drng  · Psychiatric: Has a normal mood, affect and behavior     Labs:  Reviewed. No results for input(s): NA, K, CL, CO2, PHOS, BUN, CREATININE in the last 72 hours. Invalid input(s): CA,  TSH  No results for input(s): WBC, HGB, HCT, MCV, PLT in the last 72 hours. Lab Results   Component Value Date    TROPONINI <0.01 03/29/2021     No results found for: BNP  No results found for: PROTIME, INR  No results found for: CHOL, HDL, TRIG    ECG: Personally reviewed: NSR, RBBB, HR 65, , , QTc 459    ECHO:  6.4.2021 ([facility])  Study Conclusions   - Left ventricle: The cavity size is normal. Left ventricular     geometry shows evidence of concentric remodeling, with normal     ventricular mass and increased relative wall thickness. The     calculated ejection fraction was 58%. The stroke volume is 115ml.     The stroke index is 36ml/m^2. Ejection fraction (EF) was     calculated using 2D biplane Snow&apos;s method. - Aortic valve: Thickening, consistent with sclerosis.  The mean   systolic gradient is 8mm Hg. The peak systolic gradient is 30RD     Hg. The valve area by the velocity-time integral method is     2.9cm^2. The valve area index by the velocity-time integral     method is 0.92cm^2/m^2. The valve area by the mean velocity     method is 2.2cm^2. - Inferior vena cava: The vessel was normal in size; the     respirophasic diameter changes were in the normal range (&gt;= 50%);     findings are consistent with normal central venous pressure. - Pericardium, extracardiac: A trivial pericardial effusion was     identified. Stress Test: N/A    Cardiac Angiography: N/A    Problem List:   Patient Active Problem List    Diagnosis Date Noted    Chronic heart failure with preserved ejection fraction (HFpEF) ([address] 06/02/2021    Paroxysmal atrial fibrillation ([address] 06/02/2021    Syncope and collapse 03/25/2021    Obesity 03/25/2021    Chest pain 03/24/2021    Acute respiratory failure with hypoxia ([address] 03/24/2021    Plantar fasciitis, left 04/07/2017        Assessment:   1. Paroxysmal atrial fibrillation (HCC)    2. Sick sinus syndrome ([address]    3. Pacemaker    4. Chronic diastolic heart failure ([address]         Cardiac HX: Livan Head is a 49 y.o. man with a h/o HTN, morbid obesity and syncope who p/w SOB and syncope (3/25/2021) and found to be in acute on chronic  dCHF and AF/AFL, spontaneously converted, O2 dependent, 14 day monitor (5/11/21- 5/26/21) showed avg HR 71 (), 350 pauses, longest 9.2 seconds, SB, 6 episodes AT, referred pt to ER, s/p dual ch PPM (6/4/21, Dr. Suresh Diaz), atrial lead displaced, s/p lead revision (6/22/21, Dr. Suresh Diaz)  [id number] 2. TSH 1.04 (3/24/2021).       pAF  - In NSR - no AF on device check  - On Xarelto 20 mg QD, no bleeding, continue  - On Toprol 25 mg QD  - Sleep study scheduled for this Sunday  - Echo - LA 4.3/47.7, normal EF      SSS  - 14 day monitor (5/11/21- 5/26/21) showed avg HR 71 (), 350 pauses, longest 9.2 seconds, + syncopal events  - S/p [employer] dual chamber PPM (6/4/21, Dr. Leobardo Bustillo)  - S/p lead revision (6/22/21, Dr. Leobardo Bustillo)  - Device check shows 19% AP, 1% , no AT/AF noted, no other arrhythmias noted, all other parameters stable  - Superior L chest incision well healed  - Inferior L chest incision w/ pinpoint opening at lateral edge, no drainage noted  - Reviewed wound care and activity limitations w/ pt  - F/u w/ NP in 1 month    dCHF  - Appears to be a little fluid overload  - On lasix 40 mg BID, potassium 20 mEq BID, has not been taking medication BID, educated on importance  - BMP 6/2021  - On chronic O2 - not compliant, follows w/ pulm next month  - Lifestyle modifications- diet, exercise, handouts given  - Follows w/ Dr. Mechelle Esquivel    EF of 21-20%  No known systolic HF  ASA and Statin for CAD  Anticoagulation for AF and heart failure  No Tobacco use. All questions and concerns were addressed to the patient/family. Alternatives to my treatment were discussed. The note was completed using EMR. Every effort was made to ensure accuracy; however, inadvertent computerized transcription errors may be present. Patient received education regarding their diagnosis, treatment and medications while in the office today.       [place] [address]

## 2021-07-12 ENCOUNTER — OFFICE VISIT (OUTPATIENT)
Dept: CARDIOLOGY CLINIC | Age: 56
End: 2021-07-12
Payer: MEDICAID

## 2021-07-12 ENCOUNTER — NURSE ONLY (OUTPATIENT)
Dept: CARDIOLOGY CLINIC | Age: 56
End: 2021-07-12
Payer: MEDICAID

## 2021-07-12 VITALS
HEART RATE: 65 BPM | BODY MASS INDEX: 39.17 KG/M2 | WEIGHT: 315 LBS | DIASTOLIC BLOOD PRESSURE: 76 MMHG | HEIGHT: 75 IN | SYSTOLIC BLOOD PRESSURE: 118 MMHG

## 2021-07-12 DIAGNOSIS — R55 SYNCOPE AND COLLAPSE: ICD-10-CM

## 2021-07-12 DIAGNOSIS — I48.0 PAROXYSMAL ATRIAL FIBRILLATION (HCC): ICD-10-CM

## 2021-07-12 DIAGNOSIS — R00.2 PALPITATION: ICD-10-CM

## 2021-07-12 DIAGNOSIS — Z95.0 PACEMAKER: Primary | ICD-10-CM

## 2021-07-12 DIAGNOSIS — I50.32 CHRONIC HEART FAILURE WITH PRESERVED EJECTION FRACTION (HFPEF) (HCC): ICD-10-CM

## 2021-07-12 DIAGNOSIS — I50.32 CHRONIC DIASTOLIC HEART FAILURE (HCC): ICD-10-CM

## 2021-07-12 DIAGNOSIS — I48.92 ATRIAL FLUTTER, UNSPECIFIED TYPE (HCC): ICD-10-CM

## 2021-07-12 DIAGNOSIS — I48.0 PAROXYSMAL ATRIAL FIBRILLATION (HCC): Primary | ICD-10-CM

## 2021-07-12 DIAGNOSIS — I49.5 SICK SINUS SYNDROME (HCC): ICD-10-CM

## 2021-07-12 DIAGNOSIS — R00.1 SINUS BRADYCARDIA: ICD-10-CM

## 2021-07-12 DIAGNOSIS — Z95.0 PACEMAKER: ICD-10-CM

## 2021-07-12 DIAGNOSIS — Z95.0 CARDIAC PACEMAKER: Primary | ICD-10-CM

## 2021-07-12 PROCEDURE — 93280 PM DEVICE PROGR EVAL DUAL: CPT | Performed by: INTERNAL MEDICINE

## 2021-07-12 PROCEDURE — G8427 DOCREV CUR MEDS BY ELIG CLIN: HCPCS | Performed by: NURSE PRACTITIONER

## 2021-07-12 PROCEDURE — 99214 OFFICE O/P EST MOD 30 MIN: CPT | Performed by: NURSE PRACTITIONER

## 2021-07-12 PROCEDURE — G8417 CALC BMI ABV UP PARAM F/U: HCPCS | Performed by: NURSE PRACTITIONER

## 2021-07-12 PROCEDURE — 3017F COLORECTAL CA SCREEN DOC REV: CPT | Performed by: NURSE PRACTITIONER

## 2021-07-12 PROCEDURE — 1036F TOBACCO NON-USER: CPT | Performed by: NURSE PRACTITIONER

## 2021-07-12 NOTE — PATIENT INSTRUCTIONS
20 Tips For Changing the Way You Eat    1. If you think you are hungry, drink a glass of water or a cup of coffee before eating. 2. Eat s-[id number]-o-[id number]-l-y! It takes your brain 20 minutes to catch up to your stomach and by then most of us have overeaten. 3. Eat for 10 minutes and rest for 5 minutes. If you are still hungry, start over. 4. Eat to live not live to eat! You control food, it does not control you! 5. Your stomach is the size of your fist. If you eat more than that, you have overeaten. 6. Eat when you are hungry and not because it is a scheduled time to eat. 7. If you are hungry and it is not time yet to eat your meal, eat a small snack (such as 2 peanut butter crackers, 1 tsp of peanut butter, 15 peanuts, small apple or a cup of light popcorn). Make sure you take 10 minutes to eat this snack so it will hold you over to your next meal.    8. Eat your fruit first as it will help breakdown you meal.    9. If given a lot of options at a meal  pick only 3. People who put more than one item on their plate tend to eat more. 10. Eat the one item you want the most  first! This will help fill you up more and control your hunger. 11. Get moving! Get a pedometer and try to get in as many steps as you can. 2000 steps = 1 mile. Make 10,000 steps a day your goal.    12. [place] are the only [place] that practices eating to prevent getting hungry. 13. Identify if you are an emotional eater  you want something salty or sweet instead of something healthy or you want to eat when you are not hungry. 14. Sugar contributes nothing in the way of nutrients. 15. Measure your level of hunger. The hungrier you are, the more fat you burn when you eat. 16. If you eat dessert more than 30 minutes after a meal, it turns to fat. 17. The calories in alcohol are used before stored fat calories. 18. It is ok to miss a meal if you are not hungry.     19. If snacks are not around the house, then they wont tempt you. Make better choices at the store for those times when you are really hungry. 20. Remember that food will only make you feel better when you are really hungry not when you are sad, mad, stressed or depressed.

## 2021-08-04 NOTE — PROGRESS NOTES
edema. Home medications:   Current Outpatient Medications on File Prior to Visit   Medication Sig Dispense Refill    Turmeric (QC TUMERIC COMPLEX PO) Take by mouth      Multiple Vitamin (MULTIVITAMIN ADULT PO) Take 1 tablet by mouth daily      VITAMIN D PO Take 2 tablets by mouth daily      albuterol sulfate HFA (PROAIR HFA) 108 (90 Base) MCG/ACT inhaler Inhale 2 puffs into the lungs every 4 hours as needed for Wheezing or Shortness of Breath 1 Inhaler 3    furosemide (LASIX) 40 MG tablet Take 1 tablet by mouth 2 times daily 180 tablet 3    potassium chloride (KLOR-CON M) 20 MEQ extended release tablet Take 1 tablet by mouth 2 times daily 180 tablet 3    metoprolol succinate (TOPROL XL) 25 MG extended release tablet Take 1 tablet by mouth daily 90 tablet 3    rivaroxaban (XARELTO) 20 MG TABS tablet Take 1 tablet by mouth daily (with breakfast) 90 tablet 3     No current facility-administered medications on file prior to visit. No past medical history on file. No past surgical history on file. Allergies   Allergen Reactions    Cats Claw (Uncaria Tomentosa) Hives       Social History:  Reviewed. reports that he has never smoked. He has never used smokeless tobacco. He reports current alcohol use. He reports that he does not use drugs. Family History:  Reviewed. family history is not on file. Review of System:    · Constitutional: No fevers, chills. · Eyes: No visual changes or diplopia. No scleral icterus. · ENT: No Headaches. No mouth sores or sore throat. · Cardiovascular: No for chest pain, Yes for dyspnea on exertion, No for palpitations or Yes for loss of consciousness. No cough, hemoptysis, No for pleuritic pain, or phlebitis. · Respiratory: No for cough or wheezing. No hematemesis. · Gastrointestinal: No abdominal pain, blood in stools. · Genitourinary: No dysuria, or hematuria.   · Musculoskeletal: No gait disturbance,    · Integumentary: No rash or pruritis. · Neurological: No headache, change in muscle strength, numbness or tingling. · Psychiatric: No anxiety, or depression. · Endocrine: No temperature intolerance. No excessive thirst, fluid intake, or urination. · Hem/Lymph: No abnormal bruising or bleeding, blood clots or swollen lymph nodes. · Allergic/Immunologic: No nasal congestion or hives. Physical Examination:  Vitals:    08/12/21 1406   BP: 122/68   Pulse: 69         Wt Readings from Last 3 Encounters:   08/12/21 (!) 426 lb (193.2 kg)   08/11/21 (!) 423 lb (191.9 kg)   07/12/21 (!) 434 lb 6.4 oz (197 kg)       · Constitutional: Oriented. No distress. · Head: Normocephalic and atraumatic. · Mouth/Throat: Oropharynx is clear and moist.   · Eyes: Conjunctivae clear without jaunduice. PERRL. · Neck: Neck supple. No rigidity. No JVD present. · Cardiovascular: Normal rate, regular rhythm, S1&S2. · Pulmonary/Chest: Bilateral respiratory sounds. No wheezes, No rhonchi. · Abdominal: Soft. Bowel sounds present. No distension, No tenderness. · Musculoskeletal: No tenderness. 2+ BLE edema    · Lymphadenopathy: Has no cervical adenopathy. · Neurological: Alert and oriented. Cranial nerve appears intact, No Gross deficit   · Skin: Skin is warm and dry. No rash noted. · Psychiatric: Has a normal mood, affect and behavior     Labs:  Reviewed. No results for input(s): NA, K, CL, CO2, PHOS, BUN, CREATININE in the last 72 hours. Invalid input(s): CA,  TSH  No results for input(s): WBC, HGB, HCT, MCV, PLT in the last 72 hours. Lab Results   Component Value Date    TROPONINI <0.01 03/29/2021     No results found for: BNP  No results found for: PROTIME, INR  No results found for: CHOL, HDL, TRIG    ECG: Personally reviewed: NSR, HR 69, , , QTc 433    ECHO:  6.4.2021 ([facility])  Study Conclusions   - Left ventricle:  The cavity size is normal. Left ventricular     geometry shows evidence of concentric remodeling, with normal   ventricular mass and increased relative wall thickness. The     calculated ejection fraction was 58%. The stroke volume is 115ml.     The stroke index is 36ml/m^2. Ejection fraction (EF) was     calculated using 2D biplane Snow&apos;s method. - Aortic valve: Thickening, consistent with sclerosis. The mean     systolic gradient is 8mm Hg. The peak systolic gradient is 15KC     Hg. The valve area by the velocity-time integral method is     2.9cm^2. The valve area index by the velocity-time integral     method is 0.92cm^2/m^2. The valve area by the mean velocity     method is 2.2cm^2. - Inferior vena cava: The vessel was normal in size; the     respirophasic diameter changes were in the normal range (&gt;= 50%);     findings are consistent with normal central venous pressure. - Pericardium, extracardiac: A trivial pericardial effusion was     identified. Stress Test: N/A    Cardiac Angiography: N/A    Problem List:   Patient Active Problem List    Diagnosis Date Noted    Cardiac pacemaker 07/12/2021    Chronic heart failure with preserved ejection fraction (HFpEF) ([address] 06/02/2021    Paroxysmal atrial fibrillation ([address] 06/02/2021    Syncope and collapse 03/25/2021    Obesity 03/25/2021    Chest pain 03/24/2021    Acute respiratory failure with hypoxia ([address] 03/24/2021    Plantar fasciitis, left 04/07/2017        Assessment:   1. Paroxysmal atrial fibrillation (HCC)    2. SSS (sick sinus syndrome) ([address]    3. Pacemaker    4.  Chronic diastolic heart failure ([address]         Cardiac HX: Radames Siddiqui is a 49 y.o. man with a h/o HTN, morbid obesity and syncope who p/w SOB and syncope (3/25/2021) and found to be in acute on chronic  dCHF and AF/AFL, spontaneously converted, O2 dependent, 14 day monitor (5/11/21- 5/26/21) showed avg HR 71 (), 350 pauses, longest 9.2 seconds, SB, 6 episodes AT, referred pt to ER, s/p dual ch PPM (6/4/21, Dr. Conner Ordoñez), atrial lead displaced, s/p lead revision (6/22/21, Dr. Jones Ek)  [id number] 2. TSH 1.04 (3/24/2021). pAF  - In NSR - no AF on device check  - On Xarelto 20 mg QD, no bleeding, continue  - On Toprol 25 mg QD  - MICH- wears CPAP, trying to be compliant  - Echo - LA 4.3/47.7, normal EF      SSS  - 14 day monitor (5/11/21- 5/26/21) showed avg HR 71 (), 350 pauses, longest 9.2 seconds, + syncopal events  - S/p [employer] dual chamber PPM (6/4/21, Dr. Karen Herrera)  - S/p lead revision (6/22/21, Dr. Karen Herrera)  - Device check shows pt atrially paces 28% of the time, ventricularly paces 1% of the time, no AT/AF episodes noted, all other parameters stable. - Inferior L chest incision w/ stitch present at medial edge, no redness, warmth, edema, drainage   - F/u in 2 months    dCHF  - Appears slightly fluid overloaded  - On lasix 40 mg BID, potassium 20 mEq BID, has not been taking medication BID, educated on importance  - On chronic O2 - not compliant, following w/ pulmonary  - Lifestyle modifications- diet, exercise, handouts given  - Follows w/ Dr. Arnaldo Jha    EF of 00-59%  No known systolic HF  ASA and Statin for CAD  Anticoagulation for AF  No Tobacco use. All questions and concerns were addressed to the patient/family. Alternatives to my treatment were discussed. The note was completed using EMR. Every effort was made to ensure accuracy; however, inadvertent computerized transcription errors may be present. Patient received education regarding their diagnosis, treatment and medications while in the office today.       Cecilia Pedraza [facility]

## 2021-08-11 ENCOUNTER — OFFICE VISIT (OUTPATIENT)
Dept: PULMONOLOGY | Age: 56
End: 2021-08-11
Payer: MEDICAID

## 2021-08-11 VITALS
HEART RATE: 78 BPM | HEIGHT: 75 IN | OXYGEN SATURATION: 93 % | TEMPERATURE: 92 F | RESPIRATION RATE: 14 BRPM | DIASTOLIC BLOOD PRESSURE: 90 MMHG | SYSTOLIC BLOOD PRESSURE: 170 MMHG | BODY MASS INDEX: 39.17 KG/M2 | WEIGHT: 315 LBS

## 2021-08-11 DIAGNOSIS — R06.02 SOB (SHORTNESS OF BREATH): Primary | ICD-10-CM

## 2021-08-11 DIAGNOSIS — J96.11 CHRONIC RESPIRATORY FAILURE WITH HYPOXIA (HCC): ICD-10-CM

## 2021-08-11 PROCEDURE — G8417 CALC BMI ABV UP PARAM F/U: HCPCS | Performed by: INTERNAL MEDICINE

## 2021-08-11 PROCEDURE — 99244 OFF/OP CNSLTJ NEW/EST MOD 40: CPT | Performed by: INTERNAL MEDICINE

## 2021-08-11 PROCEDURE — G8427 DOCREV CUR MEDS BY ELIG CLIN: HCPCS | Performed by: INTERNAL MEDICINE

## 2021-08-11 RX ORDER — ALBUTEROL SULFATE 90 UG/1
2 AEROSOL, METERED RESPIRATORY (INHALATION) EVERY 4 HOURS PRN
Qty: 1 INHALER | Refills: 3 | Status: SHIPPED | OUTPATIENT
Start: 2021-08-11

## 2021-08-11 NOTE — Clinical Note
Dr. Cornel Hernandez,    Thanks for referral.  I am not clear he has a lung disease at this time and his need for oxygen may be related to his obesity. I am getting PFT to assess. I may need to evaluate for liver disease due to his complaint of SOB upon standing. Patient's with liver disease can suffer from hepato-pulmonary disease due to \"shunting\"  It is rare but SOB and hypoxia upon standing are symptoms of this.  I will assess moving forward   Thanks  [place]

## 2021-08-11 NOTE — PROGRESS NOTES
PATIENT IS SEEN AT THE REQUEST OF DR. Vera for SOB/Hypoxia    Chief complaint  This is a 49y.o. year old male  who comes to see me with a chief complaint of   Chief Complaint   Patient presents with    New Patient       HPI  Here with a cc of SOB/Hypoxia    Says he has been dealing with worsening SOB for several months. This was going on before his recent admission for heart issues including sinus pauses that necessitated a pacemaker. He has no history of lung disease and never smoked. Has gained about 100lbs in weight over some time but does not think his breathing getting worse has anything to do with weight. He does get SOB about standing up and with exertion. Struggles to take a breathe in upon his first initial movements. Says breathing did get worse after suffering a fall with injury to his back. He was discharged on oxygen but does not use it much. He is not sure he does need to use it either. Recently did outpatient PSG at [address] [place] [address] 17/13.   He just received his machine and just started to use it          Medical history:    Afib, heart failure with preserved EF, HTN, MICH with bipap, DM, High cholesterol, syncope     Surgical history  Pacemaker, knee surgery     Social History     Socioeconomic History    Marital status: Single     Spouse name: Not on file    Number of children: Not on file    Years of education: Not on file    Highest education level: Not on file   Occupational History    Not on file   Tobacco Use    Smoking status: Never Smoker    Smokeless tobacco: Never Used   Vaping Use    Vaping Use: Never used   Substance and Sexual Activity    Alcohol use: Yes     Comment: rare    Drug use: Never    Sexual activity: Not on file   Other Topics Concern    Not on file   Social History Narrative    Not on file     Social Determinants of Health     Financial Resource Strain:     Difficulty of Paying Living Expenses:    Food Insecurity:     Worried About Running urine  EXTREMITIES:  No swelling, no lesions  NEURO:  No numbness or tingling, no seizures  SKIN:  No new rashes, no changes in hair or nails  LYMPH:  No masses, no swelling neck, armpits, or groin    PHYSICAL EXAM:  Vitals:    08/11/21 1326   BP: (!) 170/90   Pulse: 78   Resp: 14   Temp: 92 °F (33.3 °C)   SpO2: 93%       GENERAL:  Well nourished, alert, appears stated age, no distress, obese male  HEENT:  No scleral icterus, no conjunctival irritation  NECK:  No thyromegaly, no bruits  LYMPH:  No cervical or supraclavicular adenopathy  HEART:  Regular rate and rhythm, no murmurs  LUNGS: Poor air movement diffusely   ABDOMEN:  No distention, no organomegaly  EXTREMITIES:  ++ edema, no digital clubbing  NEURO:  No localizing deficits, CN II-XII intact    Pulmonary Function Testing  None    Chest imaging from 3/21 is reviewed. My interpretation is scattered atelectasis      ECHO 3/21  Left ventricular cavity size is normal.   There is moderate concentric left ventricular hypertrophy. Overall left   ventricular systolic function appears normal with an ejection fraction of   55-60%. No regional wall motion abnormalities are noted. Normal diastolic   function.       Walk assessment 3/21  Patient Room Air saturation at rest 86 %  Patient Room Air saturation upon ambulation 85 %     Oxygen saturations of 88% or less on RA qualifies patient for Home Oxygen     Patient resting on 2  lmp  with an oxygen saturation of  88 %      Patient ambulated on 3 lpm with an oxygen saturation of 92%     Qualifying patient for home oxygen with ambulation and continuous flow  @ 3 lpm.       Lab Results   Component Value Date    WBC 8.3 03/27/2021    HGB 14.2 03/27/2021    HCT 43.9 03/27/2021    MCV 87.3 03/27/2021     03/27/2021       No results found for: BNP    Lab Results   Component Value Date    CREATININE 0.8 (L) 03/28/2021    BUN 25 (H) 03/28/2021     03/28/2021    K 4.3 03/28/2021    CL 96 (L) 03/28/2021    CO2 25 03/28/2021       I reviewed above labs and studies pertinent to this visit and date    Assessment/Plan:  1. SOB (shortness of breath)  Most obvious reason would be his weight. Says he has gained 100 lbs over some time. No history of lung disease. Endorses platypnea which would suggest he has underling cirrhosis. He has no history of lung disease that he is aware of. Will start with PFT and prescribe albuterol to use prn for now. I have low index of suspicion for organic lung disease. Needs to lose weight and even consider weight loss surgery   - Full PFT Study With Bronchodilator; Future  - Covid-19 Ambulatory; Future  - albuterol sulfate HFA (PROAIR HFA) 108 (90 Base) MCG/ACT inhaler; Inhale 2 puffs into the lungs every 4 hours as needed for Wheezing or Shortness of Breath  Dispense: 1 Inhaler; Refill: 3    2. Chronic respiratory failure with hypoxia ([address]  Had been put on oxygen back in March. He has not been using it and is not hypoxic today. This needs further delineation. I told him to purchase and oximeter and check his levels. He may need additional work up for hypoxia such as ECHO with bubbly study. Of course his oxygen need could be obesity hypoventilation. He does not require oxygen with bipap at night which is reassuring     3. BMI 50.0-59.9, adult [facility])  Obviously this is the biggest issues for him. If he is not able to lose weight may want to consider weight loss surgery. There are no contraindications from a lung standpoint       Return to clinic after PFTs    May need work up for hepato-pulmonary syndrome but would need to have evidence of cirrhosis first.    Consultation note well will be sent to referring physician regarding findings and plan.

## 2021-08-11 NOTE — PATIENT INSTRUCTIONS
Full breathing studies    Use albuterol as needed    [facility] doctors for sleep apnea     Follow up in 6-8 weeks

## 2021-08-12 ENCOUNTER — OFFICE VISIT (OUTPATIENT)
Dept: CARDIOLOGY CLINIC | Age: 56
End: 2021-08-12
Payer: MEDICAID

## 2021-08-12 ENCOUNTER — NURSE ONLY (OUTPATIENT)
Dept: CARDIOLOGY CLINIC | Age: 56
End: 2021-08-12
Payer: MEDICAID

## 2021-08-12 VITALS
HEIGHT: 75 IN | BODY MASS INDEX: 39.17 KG/M2 | WEIGHT: 315 LBS | SYSTOLIC BLOOD PRESSURE: 122 MMHG | HEART RATE: 69 BPM | DIASTOLIC BLOOD PRESSURE: 68 MMHG

## 2021-08-12 DIAGNOSIS — I48.92 ATRIAL FLUTTER, UNSPECIFIED TYPE (HCC): ICD-10-CM

## 2021-08-12 DIAGNOSIS — Z95.0 PACEMAKER: ICD-10-CM

## 2021-08-12 DIAGNOSIS — R00.1 SINUS BRADYCARDIA: ICD-10-CM

## 2021-08-12 DIAGNOSIS — Z95.0 CARDIAC PACEMAKER: ICD-10-CM

## 2021-08-12 DIAGNOSIS — I49.5 SSS (SICK SINUS SYNDROME) (HCC): ICD-10-CM

## 2021-08-12 DIAGNOSIS — I48.0 PAROXYSMAL ATRIAL FIBRILLATION (HCC): Primary | ICD-10-CM

## 2021-08-12 DIAGNOSIS — I50.32 CHRONIC HEART FAILURE WITH PRESERVED EJECTION FRACTION (HFPEF) (HCC): ICD-10-CM

## 2021-08-12 DIAGNOSIS — I50.32 CHRONIC DIASTOLIC HEART FAILURE (HCC): ICD-10-CM

## 2021-08-12 DIAGNOSIS — R00.2 PALPITATION: ICD-10-CM

## 2021-08-12 DIAGNOSIS — I48.0 PAROXYSMAL ATRIAL FIBRILLATION (HCC): ICD-10-CM

## 2021-08-12 DIAGNOSIS — R55 SYNCOPE AND COLLAPSE: ICD-10-CM

## 2021-08-12 PROCEDURE — 99214 OFFICE O/P EST MOD 30 MIN: CPT | Performed by: NURSE PRACTITIONER

## 2021-08-12 PROCEDURE — 93280 PM DEVICE PROGR EVAL DUAL: CPT | Performed by: INTERNAL MEDICINE

## 2021-08-12 PROCEDURE — G8417 CALC BMI ABV UP PARAM F/U: HCPCS | Performed by: NURSE PRACTITIONER

## 2021-08-12 PROCEDURE — G8427 DOCREV CUR MEDS BY ELIG CLIN: HCPCS | Performed by: NURSE PRACTITIONER

## 2021-08-12 PROCEDURE — 3017F COLORECTAL CA SCREEN DOC REV: CPT | Performed by: NURSE PRACTITIONER

## 2021-08-12 PROCEDURE — 1036F TOBACCO NON-USER: CPT | Performed by: NURSE PRACTITIONER

## 2021-08-12 NOTE — PROGRESS NOTES
Device check and ov w/NPFW  [employer] DC PPM implanted by Dr. Oly Carolina at [facility] on 6/4/21 6/22/21 Repositioning of RA and RV leads by Dr. Carito Staley at [facility]. Normal device function. 13.5 years to REGGIE  All testing appears wnl. AP 28%   1%  AT/AF burden 0%  No changes  See Paceart report under the Cardiology tab.    Follow up either at [facility] in [place]

## 2021-08-23 ENCOUNTER — HOSPITAL ENCOUNTER (OUTPATIENT)
Dept: PULMONOLOGY | Age: 56
Discharge: HOME | End: 2021-08-23
Payer: MEDICAID

## 2021-08-23 DIAGNOSIS — R06.02 SOB (SHORTNESS OF BREATH): ICD-10-CM

## 2021-08-23 PROCEDURE — 94760 N-INVAS EAR/PLS OXIMETRY 1: CPT

## 2021-08-23 PROCEDURE — 94664 DEMO&/EVAL PT USE INHALER: CPT

## 2021-08-23 PROCEDURE — 6360000002 HC RX W HCPCS: Performed by: INTERNAL MEDICINE

## 2021-08-23 PROCEDURE — 94060 EVALUATION OF WHEEZING: CPT

## 2021-08-23 PROCEDURE — 94726 PLETHYSMOGRAPHY LUNG VOLUMES: CPT

## 2021-08-23 PROCEDURE — 94729 DIFFUSING CAPACITY: CPT

## 2021-08-23 RX ORDER — ALBUTEROL SULFATE 2.5 MG/3ML
2.5 SOLUTION RESPIRATORY (INHALATION) EVERY 6 HOURS PRN
Status: DISCONTINUED | OUTPATIENT
Start: 2021-08-23 | End: 2021-08-24 | Stop reason: HOSPADM

## 2021-08-23 RX ADMIN — ALBUTEROL SULFATE 2.5 MG: 2.5 SOLUTION RESPIRATORY (INHALATION) at 15:34

## 2021-08-25 NOTE — PROCEDURES
[address], [address]                               PULMONARY FUNCTION    PATIENT NAME: Caden Allen                   :        1965  MED REC NO:   [id number]                          ROOM:  ACCOUNT NO:   [de-identified]                           ADMIT DATE: 2021  PROVIDER:     Rosa Butts MD    DATE OF PROCEDURE:  2021    INDICATION:  Short of breath. BMI:  58.5. TOBACCO HISTORY:  Never smoked. Spirometry data is acceptable and reproducible. Lung volumes performed via plethysmography. Diffusion capacity not adjusted for hemoglobin. Room air oxygen saturation and SpCO are not listed. Albuterol given per protocol. SPIROMETRY:  FEV1 to FVC ratio is 78%. Prebronchodilator FEV1 is 1.61,  which is 39% of predicted. Prebronchodilator FVC is 2.08, which is 38%  of predicted. Lung volumes showed total lung capacity of 5.22, which is 69% of  predicted. Residual volume is 3.33, which is 147% of predicted. Diffusion capacity is 33.55, which is 87% of predicted. IMPRESSION:  1. Nonobstructive defect is present. 2.  There is no significant response to bronchodilators. 3.  Severe restrictive defect is present. 4.  Air trapping is present. 5.  Normal diffusion capacity. 6.  Pulmonary function tests are most consistent with chest wall and  neuromuscular disorders. I suspect his obesity is the primary problem.         Lakisha Bahena MD    D: 2021 8:38:48       T: 2021 11:01:11     [email or web address]  Job#: [id number]     Doc#: [id number]    CC:

## 2021-09-14 ENCOUNTER — NURSE ONLY (OUTPATIENT)
Dept: CARDIOLOGY CLINIC | Age: 56
End: 2021-09-14
Payer: MEDICAID

## 2021-09-14 DIAGNOSIS — I49.5 SSS (SICK SINUS SYNDROME) (HCC): ICD-10-CM

## 2021-09-14 DIAGNOSIS — Z95.0 CARDIAC PACEMAKER: ICD-10-CM

## 2021-09-16 PROCEDURE — 93294 REM INTERROG EVL PM/LDLS PM: CPT | Performed by: INTERNAL MEDICINE

## 2021-09-16 PROCEDURE — 93296 REM INTERROG EVL PM/IDS: CPT | Performed by: INTERNAL MEDICINE

## 2021-09-16 NOTE — PROGRESS NOTES
We received remote transmission from patient's dual chamber pacemaker monitor at home. Transmission shows normal sensing and pacing function. No new arrhythmias/events recorded. RYTHMIQ EGMs noted, some w possible artifact noted on V EGMs, which the device appropriately does not detect. Ap 30%   1%    EP physician will review. See interrogation under cardiology tab in the [address] [place] [address] [address] field for more details.

## 2021-09-20 ENCOUNTER — OFFICE VISIT (OUTPATIENT)
Dept: PULMONOLOGY | Age: 56
End: 2021-09-20
Payer: MEDICAID

## 2021-09-20 VITALS
SYSTOLIC BLOOD PRESSURE: 128 MMHG | HEIGHT: 75 IN | DIASTOLIC BLOOD PRESSURE: 84 MMHG | TEMPERATURE: 96 F | OXYGEN SATURATION: 94 % | RESPIRATION RATE: 16 BRPM | WEIGHT: 315 LBS | HEART RATE: 76 BPM | BODY MASS INDEX: 39.17 KG/M2

## 2021-09-20 DIAGNOSIS — J98.4 RESTRICTIVE LUNG DISEASE: Primary | ICD-10-CM

## 2021-09-20 DIAGNOSIS — K76.81 HEPATOPULMONARY SYNDROME (HCC): ICD-10-CM

## 2021-09-20 DIAGNOSIS — J96.11 CHRONIC RESPIRATORY FAILURE WITH HYPOXIA (HCC): ICD-10-CM

## 2021-09-20 PROCEDURE — 1036F TOBACCO NON-USER: CPT | Performed by: INTERNAL MEDICINE

## 2021-09-20 PROCEDURE — G8417 CALC BMI ABV UP PARAM F/U: HCPCS | Performed by: INTERNAL MEDICINE

## 2021-09-20 PROCEDURE — G8427 DOCREV CUR MEDS BY ELIG CLIN: HCPCS | Performed by: INTERNAL MEDICINE

## 2021-09-20 PROCEDURE — 99214 OFFICE O/P EST MOD 30 MIN: CPT | Performed by: INTERNAL MEDICINE

## 2021-09-20 PROCEDURE — 3017F COLORECTAL CA SCREEN DOC REV: CPT | Performed by: INTERNAL MEDICINE

## 2021-09-20 NOTE — PATIENT INSTRUCTIONS
Schedule ECHO at [facility] to use albuterol as needed, may not work or help    I will call with results after ECHO regarding plan/follow up

## 2021-09-20 NOTE — PROGRESS NOTES
Chief complaint  This is a 49y.o. year old male  who comes to see me with a chief complaint of   Chief Complaint   Patient presents with    Shortness of Breath     f/u SOB - resp failure w/ hypoxia       HPI  Here with a cc of SOB/Hypoxia    Last time he did PFT (showed restriction with normal DLCO) and I had him start using albuterol. I asked him to start checking his oxygen saturations. He says that he is doing ok. He has used albuterol a few times but he has not noticed an improvement. He still gets very SOB or unsteady once he gets up in the morning. This takes about 10-15 minutes to pass. He is not really using oxygen anymore.   Saturations have been consistently normal.  Has been using a bpap machine but is struggling to get used to the mask etc.  He follows outside of [address] [facility] for that             Current Outpatient Medications:     Turmeric (QC TUMERIC COMPLEX PO), Take by mouth, Disp: , Rfl:     Multiple Vitamin (MULTIVITAMIN ADULT PO), Take 1 tablet by mouth daily, Disp: , Rfl:     VITAMIN D PO, Take 2 tablets by mouth daily, Disp: , Rfl:     albuterol sulfate HFA (PROAIR HFA) 108 (90 Base) MCG/ACT inhaler, Inhale 2 puffs into the lungs every 4 hours as needed for Wheezing or Shortness of Breath, Disp: 1 Inhaler, Rfl: 3    furosemide (LASIX) 40 MG tablet, Take 1 tablet by mouth 2 times daily, Disp: 180 tablet, Rfl: 3    potassium chloride (KLOR-CON M) 20 MEQ extended release tablet, Take 1 tablet by mouth 2 times daily, Disp: 180 tablet, Rfl: 3    metoprolol succinate (TOPROL XL) 25 MG extended release tablet, Take 1 tablet by mouth daily, Disp: 90 tablet, Rfl: 3    rivaroxaban (XARELTO) 20 MG TABS tablet, Take 1 tablet by mouth daily (with breakfast), Disp: 90 tablet, Rfl: 3      PHYSICAL EXAM:  Vitals:    09/20/21 1334   BP: 128/84   Pulse: 76   Resp: 16   Temp: 96 °F (35.6 °C)   SpO2: 94%       GENERAL:  Well nourished, alert, appears stated age, no distress, obese male  HEENT:  No reviewed above labs and studies pertinent to this visit and date    Assessment/Plan:  1. Restrictive lung disease  Likely due to weight. I don't suspect neuromuscular issue but may need work up if he does not improve. Needs to consider weight loss surgery    2. Chronic respiratory failure with hypoxia (HCC)  Had been using 3 liters of oxygen with exertion. Not using it as much. Need is likely due to atelectasis and OHS    3. BMI 50.0-59.9, adult ([facility]  With OHS. Has MICH but follow outside of [facility]    4. Hepatopulmonary syndrome ([facility]  He still endorse platypnea. This is very specific for HPS. Will start with ECHO with bubble study looking for delayed shunting. If + will need work up for cirrhosis   - ECHO Complete With Bubble Study;  Future      I will call after ECHO for further recs etc.  Main goal should be to lose weight

## 2021-10-15 ENCOUNTER — HOSPITAL ENCOUNTER (OUTPATIENT)
Dept: NON INVASIVE DIAGNOSTICS | Age: 56
Discharge: HOME | End: 2021-10-15
Payer: MEDICAID

## 2021-10-15 LAB
LV EF: 68 %
LVEF MODALITY: NORMAL

## 2021-10-15 PROCEDURE — C8929 TTE W OR WO FOL WCON,DOPPLER: HCPCS

## 2021-10-15 PROCEDURE — 6360000004 HC RX CONTRAST MEDICATION: Performed by: INTERNAL MEDICINE

## 2021-10-15 RX ADMIN — PERFLUTREN 1.65 MG: 6.52 INJECTION, SUSPENSION INTRAVENOUS at 17:07

## 2021-10-29 NOTE — PROGRESS NOTES
XL) 25 MG extended release tablet Take 1 tablet by mouth daily 90 tablet 3    rivaroxaban (XARELTO) 20 MG TABS tablet Take 1 tablet by mouth daily (with breakfast) 90 tablet 3     No current facility-administered medications on file prior to visit. No past medical history on file. No past surgical history on file. Family History:  Reviewed. family history is not on file. Review of System:    · Constitutional: No fevers, chills. · Eyes: No visual changes or diplopia. No scleral icterus. · ENT: No Headaches. No mouth sores or sore throat. · Cardiovascular: No for chest pain, Yes for dyspnea on exertion, No for palpitations or No for loss of consciousness. No cough, hemoptysis, No for pleuritic pain, or phlebitis. · Respiratory: No for cough or wheezing. No hematemesis. · Gastrointestinal: No abdominal pain, blood in stools. · Genitourinary: No dysuria, or hematuria. · Musculoskeletal: No gait disturbance,    · Integumentary: No rash or pruritis. · Neurological: No headache, change in muscle strength, numbness or tingling. · Psychiatric: No anxiety, or depression. · Endocrine: No temperature intolerance. No excessive thirst, fluid intake, or urination. · Hem/Lymph: No abnormal bruising or bleeding, blood clots or swollen lymph nodes. · Allergic/Immunologic: No nasal congestion or hives. Physical Examination:  Vitals:    11/03/21 1346   BP: 122/82   Pulse: 68         Wt Readings from Last 3 Encounters:   11/03/21 (!) 436 lb 6.4 oz (197.9 kg)   09/20/21 (!) 427 lb (193.7 kg)   08/12/21 (!) 426 lb (193.2 kg)       · Constitutional: Oriented. No distress. · Head: Normocephalic and atraumatic. · Mouth/Throat: Oropharynx is clear and moist.   · Eyes: Conjunctivae clear without jaunduice. PERRL. · Neck: Neck supple. No rigidity. No JVD present. · Cardiovascular: Normal rate, regular rhythm, S1&S2. · Pulmonary/Chest: Bilateral respiratory sounds. No wheezes, No rhonchi. · Abdominal: Soft. Bowel sounds present. No distension, No tenderness. · Musculoskeletal: No tenderness. No edema    · Lymphadenopathy: Has no cervical adenopathy. · Neurological: Alert and oriented. Cranial nerve appears intact, No Gross deficit   · Skin: Skin is warm and dry. No rash noted. · Psychiatric: Has a normal mood, affect and behavior     Labs:  Reviewed. No results for input(s): NA, K, CL, CO2, PHOS, BUN, CREATININE in the last 72 hours. Invalid input(s): CA,  TSH  No results for input(s): WBC, HGB, HCT, MCV, PLT in the last 72 hours. Lab Results   Component Value Date    TROPONINI <0.01 03/29/2021     No results found for: BNP  No results found for: PROTIME, INR  No results found for: CHOL, HDL, TRIG    ECG: Personally reviewed: NSR, HR 67, , , QTc 434    ECHO:  10.15.2021   Summary   Normal left ventricular size. Mild-moderate concentric left ventricular hypertrophy. Preserved ejection fraction estimated at 65%-70%. Normal diastolic function. The right ventricle was poorly visualized but appears  mildly enlarged with normal function. Tapse: 2.48 cm. RV s: 15.9 cm/s   Pacer / ICD wire is visualized in the right ventricle. IVC not well visualized. Estimated pulmonary artery systolic pressure is severely elevated at 14 mmHg assuming a right atrial pressure of 3 mmHg. A bubble study was performed and does not appear to show evidence of shunting. Definity contrast agent was used to help visualize endocardial borders.      Stress Test: N/A    Cardiac Angiography: N/A    Problem List:   Patient Active Problem List    Diagnosis Date Noted    Cardiac pacemaker 07/12/2021    Chronic heart failure with preserved ejection fraction (HFpEF) ([facility] 06/02/2021    Paroxysmal atrial fibrillation ([facility] 06/02/2021    Syncope and collapse 03/25/2021    Obesity 03/25/2021    Chest pain 03/24/2021    Acute respiratory failure with hypoxia ([address] 03/24/2021    Plantar fasciitis, left 04/07/2017        Assessment:   1. Paroxysmal atrial fibrillation (HCC)    2. Chronic heart failure with preserved ejection fraction (HFpEF) ([facility]    3. SSS (sick sinus syndrome) ([facility]    4. Pacemaker       Cardiac HX: Nick Light is a 49 y.o. man with a h/o HTN, morbid obesity and syncope who p/w SOB and syncope (3/25/2021) and found to be in acute on chronic  dCHF and AF/AFL, spontaneously converted, O2 dependent, 14 day monitor (5/11/21- 5/26/21) showed avg HR 71 (), 350 pauses, longest 9.2 seconds, SB, 6 episodes AT, referred pt to ER, s/p dual ch PPM (6/4/21, Dr. Filipe Carr), atrial lead displaced, s/p lead revision (6/22/21, Dr. Filipe Carr)  [id number] 2. TSH 1.04 (3/24/2021). pAF  - In NSR - no AF on device check  - On Xarelto 20 mg QD, no bleeding, continue  - On Toprol 25 mg QD  - MICH- wears CPAP, inconsistent with use  - Echo - LA 4.3/47.7, normal EF      SSS  - 14 day monitor (5/11/21- 5/26/21) showed avg HR 71 (), 350 pauses, longest 9.2 seconds, + syncopal events  - S/p [employer] dual chamber PPM (6/4/21, Dr. Filipe Carr)  - S/p lead revision (6/22/21, Dr. Filipe Carr)  - Device check shows AP 26%,  less than 1%, 0% AF burden, all other parameters stable, 14 years to REGGIE. -Inferior chest incision completely healed  - F/u in 6 months      dCHF  - Appears slightly fluid overloaded  - On lasix 40 mg BID, potassium 20 mEq BID, has not been taking medication BID, educated on importance of being consistent with medication  - On chronic O2 - not compliant, following w/ pulmonary  - Lifestyle modifications- diet, exercise, medication compliance, Long discussion with patient about heart failure, handouts given  - Follows w/ Dr. Elfredia Beatriz     EF of 92-65%  No known systolic HF  ASA and Statin for CAD  Anticoagulation for AF  No Tobacco use. All questions and concerns were addressed to the patient/family. Alternatives to my treatment were discussed. The note was completed using EMR. Every effort was made to ensure accuracy; however, inadvertent computerized transcription errors may be present. Patient received education regarding their diagnosis, treatment and medications while in the office today.       Victor M Holden, [facility]

## 2021-11-03 ENCOUNTER — OFFICE VISIT (OUTPATIENT)
Dept: CARDIOLOGY CLINIC | Age: 56
End: 2021-11-03
Payer: MEDICAID

## 2021-11-03 ENCOUNTER — NURSE ONLY (OUTPATIENT)
Dept: CARDIOLOGY CLINIC | Age: 56
End: 2021-11-03
Payer: MEDICAID

## 2021-11-03 VITALS
HEIGHT: 75 IN | HEART RATE: 68 BPM | DIASTOLIC BLOOD PRESSURE: 82 MMHG | WEIGHT: 315 LBS | BODY MASS INDEX: 39.17 KG/M2 | SYSTOLIC BLOOD PRESSURE: 122 MMHG

## 2021-11-03 DIAGNOSIS — I48.92 ATRIAL FLUTTER, UNSPECIFIED TYPE (HCC): ICD-10-CM

## 2021-11-03 DIAGNOSIS — Z95.0 CARDIAC PACEMAKER: ICD-10-CM

## 2021-11-03 DIAGNOSIS — I50.32 CHRONIC HEART FAILURE WITH PRESERVED EJECTION FRACTION (HFPEF) (HCC): ICD-10-CM

## 2021-11-03 DIAGNOSIS — R00.2 PALPITATION: ICD-10-CM

## 2021-11-03 DIAGNOSIS — R55 SYNCOPE AND COLLAPSE: ICD-10-CM

## 2021-11-03 DIAGNOSIS — R00.1 SINUS BRADYCARDIA: ICD-10-CM

## 2021-11-03 DIAGNOSIS — Z95.0 PACEMAKER: ICD-10-CM

## 2021-11-03 DIAGNOSIS — I48.0 PAROXYSMAL ATRIAL FIBRILLATION (HCC): ICD-10-CM

## 2021-11-03 DIAGNOSIS — I48.0 PAROXYSMAL ATRIAL FIBRILLATION (HCC): Primary | ICD-10-CM

## 2021-11-03 DIAGNOSIS — I49.5 SSS (SICK SINUS SYNDROME) (HCC): ICD-10-CM

## 2021-11-03 PROCEDURE — G8417 CALC BMI ABV UP PARAM F/U: HCPCS | Performed by: NURSE PRACTITIONER

## 2021-11-03 PROCEDURE — 1036F TOBACCO NON-USER: CPT | Performed by: NURSE PRACTITIONER

## 2021-11-03 PROCEDURE — 93280 PM DEVICE PROGR EVAL DUAL: CPT | Performed by: INTERNAL MEDICINE

## 2021-11-03 PROCEDURE — 99214 OFFICE O/P EST MOD 30 MIN: CPT | Performed by: NURSE PRACTITIONER

## 2021-11-03 PROCEDURE — G8484 FLU IMMUNIZE NO ADMIN: HCPCS | Performed by: NURSE PRACTITIONER

## 2021-11-03 PROCEDURE — G8427 DOCREV CUR MEDS BY ELIG CLIN: HCPCS | Performed by: NURSE PRACTITIONER

## 2021-11-03 PROCEDURE — 3017F COLORECTAL CA SCREEN DOC REV: CPT | Performed by: NURSE PRACTITIONER

## 2021-11-03 NOTE — PROGRESS NOTES
Patient comes in for programming evaluation on their [facility] dual chamber pacemaker. [facility] DC PPM implanted by Dr. Gemini English at [facility] on 6/4/21 6/22/21 Repositioning of RA and RV leads by Dr. Stephanie Hernandez at [facility]    Last remote on 9/14/2021    All sensing and pacing parameters are within normal range. Battery life 14 years   AP 26%.  <1%. 0% AF burden. No episodes noted. PACs increased from 185 to 1.5K  PVCs increased from 8 to 69  Patient remains on metoprolol, furosemide, xarelto. Consider lowering programmed outputs at next OV, decreasing Atrial sensitivity and check RYTHMIQ count. No changes need to be made at this time. Please see interrogation for more detail. Patient will see NPWS today and follow up in 3 months in office or remotely.

## 2021-11-03 NOTE — PATIENT INSTRUCTIONS
Patient Education        Learning About Heart Failure  What is heart failure? Heart failure means that your heart muscle doesn't pump as much blood as your body needs. Failure doesn't mean that your heart has stopped. It means that your heart isn't pumping as well as it should. Because your heart cannot pump well, your body tries to make up for it. To do this:  · Your body holds on to salt and water. This increases the amount of blood in your bloodstream.  · Your heart beats faster. · Your heart might get bigger. Your body has an amazing ability to make up for heart failure. It may do such a good job that you don't know you have a disease. But at some point, your heart and body will no longer be able to keep up. Then fluid starts to build up in your lungs and other parts of your body. This fluid buildup is called congestion. It's why some doctors call the disease congestive heart failure. What can you expect when you have heart failure? Heart failure is a lifelong (chronic) disease. Treatment may be able to slow the disease and help you feel better. But heart failure tends to get worse over time. Despite this, there are many steps you can take to feel better and stay healthy longer. Early on, your symptoms may not be too bad. As heart failure gets worse, symptoms typically get worse, and you may need to limit your activities. Heart failure can also get worse suddenly. If this happens, you need emergency care. Then, after treatment, your symptoms may go back to being stable (which means they stay the same) for a long time. Heart failure can lead to other health problems, such as heart rhythm problems. Over time, your treatment options may change, especially as your symptoms get worse. You may want to think about what kind of care you want at the end of your life. What are the symptoms? Symptoms of heart failure start to happen when your heart can't pump enough blood to the rest of your body.   In the early stages of heart failure, you may:  · Feel tired easily. · Be short of breath when you exert yourself. · Feel like your heart is pounding or racing (palpitations). · Feel weak or dizzy. As heart failure gets worse, fluid starts to build up in your lungs and other parts of your body. This may cause you to:  · Feel short of breath even at rest.  · Have swelling (edema), especially in your legs, ankles, and feet. · Gain weight. This may happen over just a day or two, or more slowly. · Cough or wheeze, especially when you lie down. · Feel bloated or sick to your stomach. How is heart failure treated? Heart failure is treated with medicines and steps you take to make lifestyle changes and check your symptoms. Treatment can slow the disease and help you feel better and live longer. · You'll probably take several medicines. · You'll take steps to care for yourself at home. Martha Almanzar watch for changes in your symptoms. You may need to make lifestyle changes, such as limiting sodium, getting regular exercise, not smoking, and eating healthy foods. · You might attend cardiac rehabilitation (rehab) to get education and support that help you make lifestyle changes and stay as healthy as possible. · You may get a heart device. A pacemaker helps your heart pump blood. An ICD can stop abnormal heart rhythms. · As heart failure gets worse, palliative care can help improve your quality of life. You can do advance care planning to decide what kind of care you want at the end of your life. How can you care for yourself? There are many steps you can take to feel better and live longer. They can help you stay active and enjoy life. Take your medicine the right way. Avoid medicines that can make your symptoms worse. Check your weight and symptoms every day. Know what to do if your symptoms get worse. Limit sodium. This helps keep fluid from building up. It may help you feel better. Be active.    Exercise regularly, but don't exercise too hard. Be heart-healthy. Eat healthy foods, stay at a healthy weight, limit alcohol, and don't smoke. Stay as healthy as possible. Manage other health problems such as diabetes and high blood pressure. Avoid colds and flu, get help for depression and anxiety, and manage stress. If you think you may have a problem with alcohol or drug use, talk to your doctor. Ask your doctor if you need to limit the amount of fluids you drink. Follow-up care is a key part of your treatment and safety. Be sure to make and go to all appointments, and call your doctor if you are having problems. It's also a good idea to know your test results and keep a list of the medicines you take. Where can you learn more? Go to https://[employer]. org and sign in to your [employer] account. Enter K169 in the [employer] box to learn more about \"Learning About Heart Failure. \"     If you do not have an account, please click on the \"Sign Up Now\" link. Current as of: April 29, 2021               Content Version: 13.0  © 2244-0878 [employer], Incorporated. Care instructions adapted under license by [facility] ([facility]). If you have questions about a medical condition or this instruction, always ask your healthcare professional. [address] any warranty or liability for your use of this information.

## 2021-12-14 ENCOUNTER — NURSE ONLY (OUTPATIENT)
Dept: CARDIOLOGY CLINIC | Age: 56
End: 2021-12-14

## 2021-12-14 DIAGNOSIS — I49.5 SSS (SICK SINUS SYNDROME) (HCC): ICD-10-CM

## 2021-12-14 DIAGNOSIS — Z95.0 CARDIAC PACEMAKER: ICD-10-CM

## 2021-12-15 NOTE — PROGRESS NOTES
We received remote transmission from patient's dual chamber pacemaker monitor at home. Transmission shows normal sensing and pacing function. No new arrhythmias/events recorded. RYTHMIQ EGMs noted. Ap 31%   1%    EP physician will review. See interrogation under cardiology tab in the [address] [place] [address] [address] field for more details. Will continue to monitor remotely.

## 2021-12-18 PROCEDURE — 93294 REM INTERROG EVL PM/LDLS PM: CPT | Performed by: INTERNAL MEDICINE

## 2021-12-18 PROCEDURE — 93296 REM INTERROG EVL PM/IDS: CPT | Performed by: INTERNAL MEDICINE

## 2022-02-02 ENCOUNTER — OFFICE VISIT (OUTPATIENT)
Dept: PULMONOLOGY | Age: 57
End: 2022-02-02
Payer: MEDICAID

## 2022-02-02 VITALS
WEIGHT: 315 LBS | DIASTOLIC BLOOD PRESSURE: 80 MMHG | HEART RATE: 88 BPM | HEIGHT: 75 IN | OXYGEN SATURATION: 95 % | SYSTOLIC BLOOD PRESSURE: 130 MMHG | RESPIRATION RATE: 18 BRPM | BODY MASS INDEX: 39.17 KG/M2 | TEMPERATURE: 97.1 F

## 2022-02-02 DIAGNOSIS — J98.4 RESTRICTIVE LUNG DISEASE: Primary | ICD-10-CM

## 2022-02-02 DIAGNOSIS — J96.11 CHRONIC RESPIRATORY FAILURE WITH HYPOXIA (HCC): ICD-10-CM

## 2022-02-02 PROCEDURE — 99214 OFFICE O/P EST MOD 30 MIN: CPT | Performed by: INTERNAL MEDICINE

## 2022-02-02 PROCEDURE — 3017F COLORECTAL CA SCREEN DOC REV: CPT | Performed by: INTERNAL MEDICINE

## 2022-02-02 PROCEDURE — G8484 FLU IMMUNIZE NO ADMIN: HCPCS | Performed by: INTERNAL MEDICINE

## 2022-02-02 PROCEDURE — 1036F TOBACCO NON-USER: CPT | Performed by: INTERNAL MEDICINE

## 2022-02-02 PROCEDURE — G8417 CALC BMI ABV UP PARAM F/U: HCPCS | Performed by: INTERNAL MEDICINE

## 2022-02-02 PROCEDURE — G8427 DOCREV CUR MEDS BY ELIG CLIN: HCPCS | Performed by: INTERNAL MEDICINE

## 2022-02-02 NOTE — PROGRESS NOTES
Chief complaint  This is a 49y.o. year old male  who comes to see me with a chief complaint of   Chief Complaint   Patient presents with    Shortness of Breath    Follow-up       HPI  Here with a cc of SOB/Hypoxia    ECHO did not show shunt. Unclear if PA pressures were elevated due to report. He seems to be doing ok. Still limited with breathing but oxygen saturations seem to be higher than previously. Reports SOB when laying down and when trying to get up. Back pain also limits his ability to breath due to pain. Has some mucus from time to time and some PND/rhinitis. Last cardiology noted from the fall reports non-compliance. Says he also notices some brief periods of heart racing but only sees his HR in the 80's  He has a pacemaker too.               Current Outpatient Medications:     Turmeric (QC TUMERIC COMPLEX PO), Take by mouth, Disp: , Rfl:     Multiple Vitamin (MULTIVITAMIN ADULT PO), Take 1 tablet by mouth daily, Disp: , Rfl:     VITAMIN D PO, Take 2 tablets by mouth daily, Disp: , Rfl:     albuterol sulfate HFA (PROAIR HFA) 108 (90 Base) MCG/ACT inhaler, Inhale 2 puffs into the lungs every 4 hours as needed for Wheezing or Shortness of Breath, Disp: 1 Inhaler, Rfl: 3    furosemide (LASIX) 40 MG tablet, Take 1 tablet by mouth 2 times daily, Disp: 180 tablet, Rfl: 3    potassium chloride (KLOR-CON M) 20 MEQ extended release tablet, Take 1 tablet by mouth 2 times daily, Disp: 180 tablet, Rfl: 3    metoprolol succinate (TOPROL XL) 25 MG extended release tablet, Take 1 tablet by mouth daily, Disp: 90 tablet, Rfl: 3    rivaroxaban (XARELTO) 20 MG TABS tablet, Take 1 tablet by mouth daily (with breakfast), Disp: 90 tablet, Rfl: 3      PHYSICAL EXAM:  Vitals:    02/02/22 1347   BP: 130/80   Pulse: 88   Resp: 18   Temp: 97.1 °F (36.2 °C)   SpO2: 95%       GENERAL:  Well nourished, alert, appears stated age, no distress, obese male  HEENT:  No scleral icterus, no conjunctival irritation  NECK:  No thyromegaly, no bruits  LYMPH:  No cervical or supraclavicular adenopathy  HEART:  Regular rate and rhythm, no murmurs  LUNGS: Poor air movement but clear   ABDOMEN:  No distention, no organomegaly  EXTREMITIES:  ++ edema, no digital clubbing  NEURO:  No localizing deficits, CN II-XII intact    Pulmonary Function Testing 8/21  Moderate restriction with normal DLCO    Chest imaging from 3/21 is reviewed. My interpretation is scattered atelectasis      ECHO 10/21  Normal left ventricular size. Mild-moderate concentric left ventricular hypertrophy. Preserved ejection fraction estimated at 65%-70%. Normal diastolic function. The right ventricle was poorly visualized but appears mildly enlarged with normal function. Tapse: 2.48 cm. RV s: 15.9 cm/s Pacer / ICD wire is visualized in the right ventricle. IVC not well visualized. Estimated pulmonary artery systolic pressure is severely elevated at 14 mmHg assuming a right atrial pressure of 3 mmHg. A bubble study was performed and does not appear to show evidence of   shunting. Definity contrast agent was used to help visualize endocardial borders. Walk assessment 3/21  Patient Room Air saturation at rest 86 %  Patient Room Air saturation upon ambulation 85 %     Oxygen saturations of 88% or less on RA qualifies patient for Home Oxygen     Patient resting on 2  lmp  with an oxygen saturation of  88 %      Patient ambulated on 3 lpm with an oxygen saturation of 92%     Qualifying patient for home oxygen with ambulation and continuous flow  @ 3 lpm.         I reviewed above labs and studies pertinent to this visit and date    Assessment/Plan:  1. Restrictive lung disease  Likely due to weight +/- some weakness but doubt neuromuscular process. His normal DLCO would rule out an ILD. Needs to work on weight loss now more than ever.   Weight loss would likely help with his SOB, pain etc.      2. Chronic respiratory failure with hypoxia ([address]  Uses up to 3 liters of oxygen with exertion, when needed and bleeds in 3 liters to his bipap. His need for oxygen is likely related to obesity hypoventilation and restriction, thus weight loss could potentially resolve this    3. BMI 50.0-59.9, adult ([address]  Strongly consider weight loss surgery. I handed him a pamphlet to read up on and recommend he pursue this. HIs weight is likely only to get worse due to inability to exercise. I would have no problem clearing him for this surgery and I would assume his Cardiology would clear him too.         Needs to have his pacemaker interrogated due to his heart racing issue    BPAP is followed outside of this office    Follow up in 6 months, but unfortunately the only thing that will help his breathing is significant weight loss    Recommend over the counter nasal spray for congestion etc.

## 2022-02-02 NOTE — PATIENT INSTRUCTIONS
Use oxygen when levels drop below 88%    Consider weight loss surgery     Need to see heart doctor about heart rate    Follow up in 6 months

## 2022-03-15 ENCOUNTER — NURSE ONLY (OUTPATIENT)
Dept: CARDIOLOGY CLINIC | Age: 57
End: 2022-03-15
Payer: MEDICAID

## 2022-03-15 DIAGNOSIS — Z95.0 CARDIAC PACEMAKER: ICD-10-CM

## 2022-03-15 DIAGNOSIS — I48.0 PAROXYSMAL ATRIAL FIBRILLATION (HCC): ICD-10-CM

## 2022-03-15 DIAGNOSIS — I49.5 SSS (SICK SINUS SYNDROME) (HCC): ICD-10-CM

## 2022-03-15 PROCEDURE — 93296 REM INTERROG EVL PM/IDS: CPT | Performed by: INTERNAL MEDICINE

## 2022-03-15 PROCEDURE — 93294 REM INTERROG EVL PM/LDLS PM: CPT | Performed by: INTERNAL MEDICINE

## 2022-03-15 NOTE — PROGRESS NOTES
We received remote transmission from patient's dual chamber pacemaker monitor at home. Transmission shows normal sensing and pacing function. No new arrhythmias/events recorded. RYTHMIQ EGMs noted. Ap 31%   1%    EP physician will review. See interrogation under cardiology tab in the [address] [place] [address] [address] field for more details. Will continue to monitor remotely.

## 2022-04-25 ENCOUNTER — TELEPHONE (OUTPATIENT)
Dept: PULMONOLOGY | Age: 57
End: 2022-04-25

## 2022-04-25 DIAGNOSIS — J96.11 CHRONIC RESPIRATORY FAILURE WITH HYPOXIA (HCC): Primary | ICD-10-CM

## 2022-04-25 NOTE — TELEPHONE ENCOUNTER
SW patient regarding his O2 order. He will need to have a 6 MWT test done in order to continue his oxygen order thru [address] Patient was transferred to department to scheduled this 6 MWT. Will need to send over his paperwork once this test has been completed and evaluated.

## 2022-05-04 ENCOUNTER — TELEPHONE (OUTPATIENT)
Dept: PULMONOLOGY | Age: 57
End: 2022-05-04

## 2022-05-04 NOTE — TELEPHONE ENCOUNTER
Reny/MSC/ would like results of tomorrow's 6 minute walk test faxed to [address] [place] [address] at [phone]

## 2022-05-13 ENCOUNTER — HOSPITAL ENCOUNTER (OUTPATIENT)
Dept: CARDIAC REHAB | Age: 57
Setting detail: THERAPIES SERIES
Discharge: HOME | End: 2022-05-13
Payer: MEDICAID

## 2022-05-13 DIAGNOSIS — J96.11 CHRONIC RESPIRATORY FAILURE WITH HYPOXIA (HCC): ICD-10-CM

## 2022-05-13 PROCEDURE — 94618 PULMONARY STRESS TESTING: CPT | Performed by: INTERNAL MEDICINE

## 2022-05-13 PROCEDURE — 94618 PULMONARY STRESS TESTING: CPT

## 2022-05-13 NOTE — PROCEDURES
[facility] Cardiopulmonary Rehabilitation   Six Minute Walk Test    PAWAN LEWISB: 1965  Account Number: [de-identified]  AGE: 56 y.o.     OP test [x]   Pulmonary Rehab PRE []  POST   []    Diagnosis: Chronic respiratory failure with hypoxia      Referring Physician: Dr. Shannon Babin [x]  male [] female AGE: 56     Height: 6 ft 2 in 188 cm    Weight:438lbs  199 kg  Blood Pressure 138/80      Medications affecting heart rate:  Albuterol sulfate 2 puffs every 4 hours as needed, metoprolol 25 mg daily      Supplemental O2 during the test []  no [x] yes 2 lpm     [x] continuous []  intermittent    Device : [x] NC []  HFNC    []  oximizer  [] mask      Laps completed: 7 (1 lap = 100 ft)        Baseline (resting) Walking End of Test (recovery)      Heart Rate 69   108  75    BP  138/80   142/80  140/80    Dyspnea 1   6  3 (Celeste scale)    Fatigue 0.5   6  2 (Celeste scale)    SpO2  94%   87% RA     O2 95% 2L  93% 2L          97% 2L         Stopped or paused before 6 mins? []  no [x] yes How long? 50 seconds    Symptoms at end of exercise:[] angina  [] dizziness  [x] back pain       []  no complaints []  other    Number of laps 7 (x 30.48 meters) + 7: 220 meters    Total distance walked in 6 mins: 220 meters    Predicted distance: 488 meters  Percent predicted:45%              [] normal       [x] reduced     Summary: This is an outpatient 6 minute walk test, performed on supplemental oxygen. The patient ambulated 220 meters which is abnormal- 45-% predicted. His, heart rate response was normal, the blood pressure response was normal, oxygen saturations were abnormal as he desaturated while ambulating on room air. The patient desaturated to 87% while ambulating on room air, and was placed on 2 L of oxygen to keep saturations above 90%. The degree of symptoms based on the Celeste Dyspnea/Fatigue scale were increased with testing.   This test does indicate the need for supplemental oxygen.             Joanne Skelton DO  Pulmonary Rehab Director

## 2022-05-13 NOTE — PROGRESS NOTES
[facility] CardiopulmonaryRehabilitation Qualifying Data for Home Oxygen        Resting Oxygen Saturation on Room Air:   94%    Exercise Oxygen Saturation on Room Air:  87%      Resting Oxygen Saturation on Oxygen:  95% 2L      Exercise Oxygen Saturation on Oxygen: 93% 2L

## 2022-05-16 ENCOUNTER — TELEPHONE (OUTPATIENT)
Dept: PULMONOLOGY | Age: 57
End: 2022-05-16

## 2022-05-16 NOTE — TELEPHONE ENCOUNTER
SW patient. He uses [facility] for his DME regarding his oxygen (POC) and his CPAP supplies. He is not happy with them and wants to use someone else the insurance suggested. He will call back with this information to submit the order for POC O2 at 2 liters.

## 2022-05-16 NOTE — TELEPHONE ENCOUNTER
----- Message from Paramjit Ruiz DO sent at 5/16/2022 12:21 PM EDT -----  He qualifies for 2 liters of oxygen.   I don't remember if he has a POC but he could get one

## 2022-06-02 NOTE — PROGRESS NOTES
[address]   Cardiac Consultation    Referring Provider:  Jr Hay DO     Chief Complaint   Patient presents with    Dizziness     occasional dizzy spells    Other     can not ihale for minute or two after standing in am    Other     burning around pacer site in am     HPI:  Debora Sanchez is a 56 y.o. male with PMH of HTN, morbid obesity and syncope who p/w SOB and syncope (3/2021), found to be in acute on chronic dCHF and AF/AFL, spontaneously converted, MICH on CPAP, O2 dependent, 14 day monitor (5/2021) showed 350 pauses (longest 9.2 seconds), referred pt to ER, s/p dual chamber PPM (6/4/21, Dr. Diallo Man), atrial lead displaced, s/p lead revision (6/22/21, Dr. Diallo Man). He is here today for 6 mo f/u. He complains today of \"inability to inhale\" as soon as he stands up after getting out of bed for the first time in the morning. He forces himself to take a few steps and he can inhale after he stabilizes himself. This has been going on for a while, but lately he has been feeling a burning sensation around his PPM site that correlates with these episodes. This has been happening daily after laying down and getting up and lasts only a couple minutes. He follows Dr. Ericka Cohen, pulmonology. It did not happen after lying down in office. Device interrogation today shows normally functioning PPM with stable sensing and pacing thresholds. AP 32%,  2%. No AT/AF. Single episode of NSVT lasting 15 sec. PVC increased from 71 to 535/hr. REGGIE at 12.5 yrs. Past Medical History:   has a past medical history of Hypertension. Surgical History:   has no past surgical history on file. Social History:   reports that he has never smoked. He has never used smokeless tobacco. He reports current alcohol use. He reports that he does not use drugs. Family History:  family history is not on file.      Home Medications:  Outpatient Encounter Medications as of 6/6/2022   Medication Sig Dispense Refill    rivaroxaban (XARELTO) 20 MG TABS tablet Take 1 tablet by mouth daily (with breakfast) 90 tablet 3    Turmeric (QC TUMERIC COMPLEX PO) Take by mouth      Multiple Vitamin (MULTIVITAMIN ADULT PO) Take 1 tablet by mouth daily      VITAMIN D PO Take 2 tablets by mouth daily      furosemide (LASIX) 40 MG tablet Take 1 tablet by mouth 2 times daily (Patient taking differently: Take 40 mg by mouth daily ) 180 tablet 3    potassium chloride (KLOR-CON M) 20 MEQ extended release tablet Take 1 tablet by mouth 2 times daily 180 tablet 3    metoprolol succinate (TOPROL XL) 25 MG extended release tablet Take 1 tablet by mouth daily 90 tablet 3    albuterol sulfate HFA (PROAIR HFA) 108 (90 Base) MCG/ACT inhaler Inhale 2 puffs into the lungs every 4 hours as needed for Wheezing or Shortness of Breath (Patient not taking: Reported on 6/6/2022) 1 Inhaler 3    [DISCONTINUED] rivaroxaban (XARELTO) 20 MG TABS tablet Take 1 tablet by mouth daily (with breakfast) 90 tablet 3     No facility-administered encounter medications on file as of 6/6/2022. Allergies:  Patient has no known allergies. Review of Systems   Constitutional: Negative for activity change and appetite change. HENT: Negative for facial swelling and neck pain. Eyes: Negative for discharge and itching. Respiratory: Negative for chest tightness and shortness of breath. see hpi, inspiratory sob for two minutes in am   Sees Dr. Aditya Mobley for this  Cardiovascular: Negative for palpitations. Negative for chest pain. Negative for leg swelling. Gastrointestinal: Negative for abdominal pain and abdominal distention. Genitourinary: Negative. Musculoskeletal: Negative. Skin: Negative for color change and pallor. Neurological: Negative for dizziness, syncope and light-headedness. Hematological: Negative. Psychiatric/Behavioral: Negative for behavioral problems and agitation.     /80 (Site: Left Lower Arm, Position: Sitting, Cuff Size: Large Adult)   Pulse 70   Ht 6' 2\" (1.88 m)   Wt (!) 423 lb 12.8 oz (192.2 kg)   BMI 54.41 kg/m²     ECG 6/6/22, personally reviewed. SR 70    14 day monitor (5/11-5/26/21):  SR with avg HR 71 (), 350 pauses, longest 9.2 seconds, 6 episodes AT    Echo 10/15/21  Summary   Normal left ventricular size. Mild-moderate concentric left ventricular hypertrophy. Preserved ejection fraction estimated at 65%-70%. Normal diastolic function. The right ventricle was poorly visualized but appears mildly enlarged with normal function. Tapse: 2.48 cm. RV s: 15.9 cm/s   Pacer / ICD wire is visualized in the right ventricle. IVC not well visualized. Estimated pulmonary artery systolic pressure is severely elevated at 14 mmHg assuming a right atrial pressure of 3 mmHg. A bubble study was performed and does not appear to show evidence of shunting. Definity contrast agent was used to help visualize endocardial borders. Objective:  Physical Exam   Nursing note and vitals reviewed. Constitutional: He appears well-developed and well-nourished. Head:  atraumatic. Eyes: Right eye exhibits no discharge. Left eye exhibits no discharge. Neck: Neck supple. Cardiovascular: Normal rate, regular rhythm and normal heart sounds. Pulmonary/Chest: Effort normal and breath sounds normal.   Abdominal: Soft. Musculoskeletal: He exhibits no edema. Neurological: He is alert without any gross abnormalities. Skin: Skin is warm and dry. Psychiatric: He has a normal mood and affect. Left pectoral site intact/ no evidence for any abnormality except mild keloid both incisions. Assessment:  1. SSS, s/p dual chamber PPM - Device with normal function today,  2%   2. PAF -  No AF seen on interrogation    3. HFpEF    4. HTN    5. MICH- compliant with CPAP  6. Burning at [facility] site when going from laying to standing. No evidence that pacer the cause.    7.      Inspiratory inability to breath when standing in am which resolves with ambulation     Plan:  1. No change in current medications  2. Continue with remote home transmissions every 3 months  3. Follow up in 6 months with EP NP or sooner if worsening sx    I, Dr. Hawkins [place], personally performed the services described in this documentation as scribed by Edwin Stevenson RN in my presence, and it is both accurate and complete. Aysha Trivedi.  Kervin DE SOUZA

## 2022-06-06 ENCOUNTER — NURSE ONLY (OUTPATIENT)
Dept: CARDIOLOGY CLINIC | Age: 57
End: 2022-06-06
Payer: MEDICAID

## 2022-06-06 ENCOUNTER — OFFICE VISIT (OUTPATIENT)
Dept: CARDIOLOGY CLINIC | Age: 57
End: 2022-06-06

## 2022-06-06 VITALS
DIASTOLIC BLOOD PRESSURE: 80 MMHG | BODY MASS INDEX: 40.43 KG/M2 | HEIGHT: 74 IN | HEART RATE: 70 BPM | WEIGHT: 315 LBS | SYSTOLIC BLOOD PRESSURE: 110 MMHG

## 2022-06-06 DIAGNOSIS — I10 ESSENTIAL HYPERTENSION: ICD-10-CM

## 2022-06-06 DIAGNOSIS — Z95.0 CARDIAC PACEMAKER: ICD-10-CM

## 2022-06-06 DIAGNOSIS — I49.5 SSS (SICK SINUS SYNDROME) (HCC): ICD-10-CM

## 2022-06-06 DIAGNOSIS — Z95.0 CARDIAC PACEMAKER: Primary | ICD-10-CM

## 2022-06-06 DIAGNOSIS — I48.0 PAROXYSMAL ATRIAL FIBRILLATION (HCC): ICD-10-CM

## 2022-06-06 DIAGNOSIS — I50.32 CHRONIC HEART FAILURE WITH PRESERVED EJECTION FRACTION (HFPEF) (HCC): ICD-10-CM

## 2022-06-06 DIAGNOSIS — R55 SYNCOPE AND COLLAPSE: ICD-10-CM

## 2022-06-06 PROCEDURE — 99214 OFFICE O/P EST MOD 30 MIN: CPT | Performed by: INTERNAL MEDICINE

## 2022-06-06 PROCEDURE — G8427 DOCREV CUR MEDS BY ELIG CLIN: HCPCS | Performed by: INTERNAL MEDICINE

## 2022-06-06 PROCEDURE — G8417 CALC BMI ABV UP PARAM F/U: HCPCS | Performed by: INTERNAL MEDICINE

## 2022-06-06 PROCEDURE — 1036F TOBACCO NON-USER: CPT | Performed by: INTERNAL MEDICINE

## 2022-06-06 PROCEDURE — 93280 PM DEVICE PROGR EVAL DUAL: CPT | Performed by: INTERNAL MEDICINE

## 2022-06-06 NOTE — PROGRESS NOTES
Patient comes in for programming evaluation on their [facility] dual chamber pacemaker. (HX A/V revision 6.22.2021 Dr. Disha Lang)    Last remote 3.15.2022    All sensing and pacing parameters are within normal range. Battery life 12.5 years  AP 32%.  2%. AT/AF burden 0  No episodes noted. PAC increase from 1.6K to 2.6K  PVC increase from 71 to 535  1 Nsvt on 4.8.2022 @ 1446 x 15 secs. Patient remains on furosemide, metoprolol, xarelto,   Please see interrogation for more detail. Patient will see Dr. Edie Calhoun today and follow up in 3 months in office or remotely.

## 2022-06-14 ENCOUNTER — NURSE ONLY (OUTPATIENT)
Dept: CARDIOLOGY CLINIC | Age: 57
End: 2022-06-14
Payer: MEDICAID

## 2022-06-14 DIAGNOSIS — Z95.0 CARDIAC PACEMAKER: ICD-10-CM

## 2022-06-14 DIAGNOSIS — I49.5 SSS (SICK SINUS SYNDROME) (HCC): ICD-10-CM

## 2022-06-15 PROCEDURE — 93294 REM INTERROG EVL PM/LDLS PM: CPT | Performed by: INTERNAL MEDICINE

## 2022-06-15 PROCEDURE — 93296 REM INTERROG EVL PM/IDS: CPT | Performed by: INTERNAL MEDICINE

## 2022-06-15 NOTE — PROGRESS NOTES
We received remote transmission from patient's dual chamber pacemaker monitor at home. Transmission shows normal sensing and pacing function. No new arrhythmias/events recorded. RYTHMIQ EGMs noted. Ap 32%   2%    EP physician will review. See interrogation under cardiology tab in the [address] [place] [address] [address] field for more details. Will continue to monitor remotely.

## 2022-07-06 NOTE — TELEPHONE ENCOUNTER
Requested Prescriptions     Pending Prescriptions Disp Refills    metoprolol succinate (TOPROL XL) 25 MG extended release tablet [Pharmacy Med Name: METOPROLOL ER SUCCINATE 25MG TABS] 90 tablet 3     Sig: TAKE 1 TABLET BY MOUTH DAILY                  Last Office Visit: 11/3/2021     Next Office Visit: 12/06/2022

## 2022-07-07 RX ORDER — METOPROLOL SUCCINATE 25 MG/1
25 TABLET, EXTENDED RELEASE ORAL DAILY
Qty: 90 TABLET | Refills: 3 | Status: SHIPPED | OUTPATIENT
Start: 2022-07-07

## 2022-07-19 NOTE — TELEPHONE ENCOUNTER
Requested Prescriptions     Pending Prescriptions Disp Refills    potassium chloride (KLOR-CON M) 20 MEQ extended release tablet [Pharmacy Med Name: POTASSIUM CL 20MEQ ER TABLETS] 180 tablet 3     Sig: TAKE 1 TABLET BY MOUTH TWICE DAILY          Number: 180    Refills: 3    Last Office Visit: 11/3/2021     Next Office Visit: 12/6/22

## 2022-07-21 RX ORDER — POTASSIUM CHLORIDE 20 MEQ/1
TABLET, EXTENDED RELEASE ORAL
Qty: 180 TABLET | Refills: 3 | Status: SHIPPED | OUTPATIENT
Start: 2022-07-21

## 2022-08-01 RX ORDER — FUROSEMIDE 40 MG/1
40 TABLET ORAL 2 TIMES DAILY
Qty: 180 TABLET | Refills: 3 | Status: SHIPPED | OUTPATIENT
Start: 2022-08-01

## 2022-08-01 NOTE — TELEPHONE ENCOUNTER
Requested Prescriptions     Pending Prescriptions Disp Refills    furosemide (LASIX) 40 MG tablet 180 tablet 2     Sig: Take 1 tablet by mouth in the morning and 1 tablet before bedtime.         Last Office Visit: 6/6/2022     Next Office Visit: 9/20/2022

## 2022-08-11 ENCOUNTER — OFFICE VISIT (OUTPATIENT)
Dept: PULMONOLOGY | Age: 57
End: 2022-08-11
Payer: MEDICAID

## 2022-08-11 VITALS
SYSTOLIC BLOOD PRESSURE: 138 MMHG | RESPIRATION RATE: 20 BRPM | BODY MASS INDEX: 40.43 KG/M2 | TEMPERATURE: 97.1 F | HEIGHT: 74 IN | OXYGEN SATURATION: 94 % | DIASTOLIC BLOOD PRESSURE: 82 MMHG | WEIGHT: 315 LBS | HEART RATE: 69 BPM

## 2022-08-11 DIAGNOSIS — J96.11 CHRONIC RESPIRATORY FAILURE WITH HYPOXIA (HCC): ICD-10-CM

## 2022-08-11 DIAGNOSIS — J98.4 RESTRICTIVE LUNG DISEASE: Primary | ICD-10-CM

## 2022-08-11 PROCEDURE — 1036F TOBACCO NON-USER: CPT | Performed by: INTERNAL MEDICINE

## 2022-08-11 PROCEDURE — 99214 OFFICE O/P EST MOD 30 MIN: CPT | Performed by: INTERNAL MEDICINE

## 2022-08-11 PROCEDURE — 3017F COLORECTAL CA SCREEN DOC REV: CPT | Performed by: INTERNAL MEDICINE

## 2022-08-11 PROCEDURE — G8427 DOCREV CUR MEDS BY ELIG CLIN: HCPCS | Performed by: INTERNAL MEDICINE

## 2022-08-11 PROCEDURE — G8417 CALC BMI ABV UP PARAM F/U: HCPCS | Performed by: INTERNAL MEDICINE

## 2022-08-11 NOTE — PROGRESS NOTES
Chief complaint  This is a 56y.o. year old male  who comes to see me with a chief complaint of   Chief Complaint   Patient presents with    Follow-up       HPI  Here with a cc of Hypoxia     He is actually improving in terms of breathing. Did walk test a few months ago and still needed to use 2 liters of oxygen. He has been checking oxygen levels and his levels are not as low as before. This is likely because he has lost some weight (~23 lbs). He has noticed improvement in breathing but not completely asymptomatic. He is SOB with stairs and long walks. Albuterol does not help much. Still thinking about weight loss surgery but would prefer to lose the weight on his own            Current Outpatient Medications:     furosemide (LASIX) 40 MG tablet, Take 1 tablet by mouth in the morning and 1 tablet before bedtime. , Disp: 180 tablet, Rfl: 3    potassium chloride (KLOR-CON M) 20 MEQ extended release tablet, TAKE 1 TABLET BY MOUTH TWICE DAILY, Disp: 180 tablet, Rfl: 3    metoprolol succinate (TOPROL XL) 25 MG extended release tablet, TAKE 1 TABLET BY MOUTH DAILY, Disp: 90 tablet, Rfl: 3    rivaroxaban (XARELTO) 20 MG TABS tablet, Take 1 tablet by mouth daily (with breakfast), Disp: 90 tablet, Rfl: 3    Turmeric (QC TUMERIC COMPLEX PO), Take by mouth, Disp: , Rfl:     Multiple Vitamin (MULTIVITAMIN ADULT PO), Take 1 tablet by mouth daily, Disp: , Rfl:     VITAMIN D PO, Take 2 tablets by mouth daily, Disp: , Rfl:     albuterol sulfate HFA (PROAIR HFA) 108 (90 Base) MCG/ACT inhaler, Inhale 2 puffs into the lungs every 4 hours as needed for Wheezing or Shortness of Breath, Disp: 1 Inhaler, Rfl: 3      PHYSICAL EXAM:  Vitals:    08/11/22 1322   BP: 138/82   Pulse: 69   Resp: 20   Temp: 97.1 °F (36.2 °C)   SpO2: 94%       GENERAL:  Well nourished, alert, appears stated age, no distress, obese male  HEENT:  No scleral icterus, no conjunctival irritation  NECK:  No thyromegaly, no bruits  LYMPH:  No cervical or supraclavicular adenopathy  HEART:  Regular rate and rhythm, no murmurs  LUNGS: Clear but diminished due to his weight   ABDOMEN:  No distention, no organomegaly  EXTREMITIES:  ++ edema, no digital clubbing  NEURO:  No localizing deficits, CN II-XII intact    Pulmonary Function Testing 8/21  Moderate restriction with normal DLCO    Chest imaging from 3/21 is reviewed. My interpretation is scattered atelectasis      ECHO 10/21  Normal left ventricular size. Mild-moderate concentric left ventricular hypertrophy. Preserved ejection fraction estimated at 65%-70%. Normal diastolic function. The right ventricle was poorly visualized but appears mildly enlarged with normal function. Tapse: 2.48 cm. RV s: 15.9 cm/s Pacer / ICD wire is visualized in the right ventricle. IVC not well visualized. Estimated pulmonary artery systolic pressure is severely elevated at 14 mmHg assuming a right atrial pressure of 3 mmHg. A bubble study was performed and does not appear to show evidence of   shunting. Definity contrast agent was used to help visualize endocardial borders. Walk assessment 5/22  The patient ambulated 220 meters which is abnormal- 45-% predicted. His, heart rate response was normal, the blood pressure response was normal, oxygen saturations were abnormal as he desaturated while ambulating on room air. The patient desaturated to 87% while ambulating on room air, and was placed on 2 L of oxygen to keep saturations above 90%. The degree of symptoms based on the Celeste Dyspnea/Fatigue scale were increased with testing. This test does indicate the need for supplemental oxygen. I reviewed above labs and studies pertinent to this visit and date    Assessment/Plan:  1. Restrictive lung disease  Due to weight. Breathing has improved with 23 lbs of weight loss. He is encouraged to keep losing weight. Consider weight loss surgery if he gets to a plateau of weight loss    2.  Chronic respiratory failure with hypoxia (HCC)  Uses 2 liters of oxygen with exertion. I am hopeful with weight loss he will resolve his hypoxia. Time will tell    3. BMI 50.0-59.9, adult ([address]  Working on weight loss. Weight loss surgery may still be an option at some point.   Would really help him      BPAP is followed outside of this office    Follow up in 6 months

## 2022-09-20 ENCOUNTER — NURSE ONLY (OUTPATIENT)
Dept: CARDIOLOGY CLINIC | Age: 57
End: 2022-09-20
Payer: MEDICAID

## 2022-09-20 DIAGNOSIS — R00.1 BRADYCARDIA: ICD-10-CM

## 2022-09-20 DIAGNOSIS — Z95.0 CARDIAC PACEMAKER: Primary | ICD-10-CM

## 2022-09-20 DIAGNOSIS — R55 SYNCOPE AND COLLAPSE: ICD-10-CM

## 2022-09-20 PROCEDURE — 93296 REM INTERROG EVL PM/IDS: CPT | Performed by: INTERNAL MEDICINE

## 2022-09-20 PROCEDURE — 93294 REM INTERROG EVL PM/LDLS PM: CPT | Performed by: INTERNAL MEDICINE

## 2022-09-21 NOTE — PROGRESS NOTES
We received remote transmission from patient's dual chamber pacemaker monitor at home. Transmission shows normal sensing and pacing function. No new arrhythmias/events recorded. RYTHMIQ EGMs noted. Ap 51%   12%    EP physician will review. See interrogation under cardiology tab in the [address] [place] [address] [address] field for more details. Will continue to monitor remotely. (End of 91-day monitoring period 9/20/22).

## 2022-12-20 NOTE — PROGRESS NOTES
Device check and ov w/NPFW  BSc DC PPM implanted by Dr. Arsalan Jimenez at [facility] on 6/4/21  PROCEDURES PERFORMED:  6/22/21 Repositioning of RA and RV leads by Dr. Ivania Melgar at [facility]. Patient has 2 incisions on left pectoral region. Patient stated he was seen at [facility] for wound and device check recently. Removed 1 suture from distal incision today. NPFW reviewed. No increased swelling, drainage, or redness noted. Patient will decided if he wishes to continue device care at [facility] or [facility] and update office so proper care management is established. Normal device function. 13 years to REGGIE  All testing appears wnl. PACs noted during Atrial threshold test and FFRW, but not detected. Presenting AsVs @ 70-75 bpm  AP 19%   1%  AT/AF burden 0%  PACs increased from 84/hr to 177/hr  PVCs decreased from 8/hr to 5/hr  1 ATR noted occurring on RA/RV lead revision procedure date 6/22/21.   10 RythmIQ noted since 7/11/21, longest x 3 min 21 secs. [facility] feature when activated, the device operates in [id number]-VVIR backup. It will automatically execute a mode switch to DDDR mode when AV CONDUCTION Disorder is suspected.)    No changes  See Paceart report under the Cardiology tab. Follow up either at [facility] or [facility], which every patient prefers. Yes

## 2023-01-03 ENCOUNTER — NURSE ONLY (OUTPATIENT)
Dept: CARDIOLOGY CLINIC | Age: 58
End: 2023-01-03

## 2023-01-03 DIAGNOSIS — Z95.0 CARDIAC PACEMAKER: Primary | ICD-10-CM

## 2023-01-03 DIAGNOSIS — I49.5 SSS (SICK SINUS SYNDROME) (HCC): ICD-10-CM

## 2023-01-05 NOTE — PROGRESS NOTES
[facility] 83   Electrophysiology  Office Visit  Date: 1/11/2023    Chief Complaint   Patient presents with    Bradycardia    Loss of Consciousness    Hypertension    Atrial Fibrillation    Congestive Heart Failure     Cardiac HX: Liv Guillory is a 56 y.o. man with a h/o HTN, hypoxia, O2 dependent has not been wearing, MICH on BiPAP, morbid obesity and syncope who p/w SOB and syncope (3/25/2021), found to be in acute on chronic dCHF and  in AF/AFL, spontaneously converted, 350 pauses, longest 9.2 seconds, SB, 6 episodes AT, referred pt to ER, s/p dual ch BSI PPM (6/4/21, Dr. Eugene Kay), atrial lead displaced, s/p lead revision (6/22/21, Dr. Eugene Kay). Interval History/HPI: Patient is here for follow-up for syncope, SSS, PPM management and a AF/AFL. Patient was originally seen with episodes of syncope in March 2021. He had had previous episodes of syncope however presented on 3/25/2021 with shortness of breath and another syncopal event. He was found to be in acute on chronic diastolic heart failure and noted to have AF/AFL. He spontaneously converted to normal sinus rhythm. He wore an outpatient monitor (5/11/2021 - 5/26/2021) that showed 350 pauses with the longest being 9.2 seconds in length. Patient was advised to present to the emergency room and he went to [facility].  He underwent a dual-chamber BSI PPM on 6/4/2021. The next day was noted that his atrial lead was displaced and he underwent a lead revision on 6/22/2021. At his follow-up he complained of increased shortness of breath and underwent a sleep study that showed MICH and is now on a BiPAP. He does follow with pulmonology and is O2 dependent on 2 L of oxygen however is not using. He states his sats are in the low 90s. He remains morbidly obese and is trying to lose weight. He is down 31 pounds from a year ago. He is c/o burning and itching at his left device site.   He states he notices it mainly when he is bending over or when he goes from lying to sitting. It lasts very brief and feels like a burning or itching internally. Looking at his chest today appears that his incisions are are mildly keloid which could explain the burning and itching sensation that he feels. We did talk today about using some creams over his incisions to see if that would help with the discomfort. His blood pressure was elevated in the office today on recheck he was 128/82. He does complain of some shortness of breath with exertion. He has followed with Dr. Madison Kulkarni in the past for chronic diastolic heart failure however is not seen him in quite some time. He states that he is not taking the Lasix because it makes him go to the bathroom frequently. He does appear to have 2+ edema in his feet and his ankles bilaterally. He denies any chest pain, shortness of breath, PND orthopnea. Review of device today shows 56% AP, 11% , no episodes of AF, other parameters stable. Home medications:   Current Outpatient Medications on File Prior to Visit   Medication Sig Dispense Refill    Krill Oil 500 MG CAPS Take by mouth      furosemide (LASIX) 40 MG tablet Take 1 tablet by mouth in the morning and 1 tablet before bedtime.  (Patient taking differently: Take 40 mg by mouth 2 times daily Takes infrequently) 180 tablet 3    potassium chloride (KLOR-CON M) 20 MEQ extended release tablet TAKE 1 TABLET BY MOUTH TWICE DAILY (Patient taking differently: Takes infrequently) 180 tablet 3    metoprolol succinate (TOPROL XL) 25 MG extended release tablet TAKE 1 TABLET BY MOUTH DAILY 90 tablet 3    rivaroxaban (XARELTO) 20 MG TABS tablet Take 1 tablet by mouth daily (with breakfast) 90 tablet 3    Turmeric (QC TUMERIC COMPLEX PO) Take by mouth      Multiple Vitamin (MULTIVITAMIN ADULT PO) Take 1 tablet by mouth daily      VITAMIN D PO Take 2 tablets by mouth daily      albuterol sulfate HFA (PROAIR HFA) 108 (90 Base) MCG/ACT inhaler Inhale 2 puffs into the lungs every 4 hours as needed for Wheezing or Shortness of Breath 1 Inhaler 3     No current facility-administered medications on file prior to visit. Past Medical History:   Diagnosis Date    Hypertension         No past surgical history on file. No Known Allergies      Social History:  Reviewed. reports that he has never smoked. He has never used smokeless tobacco. He reports current alcohol use. He reports that he does not use drugs. Family History:  Reviewed. family history is not on file. Review of System:    Constitutional: No fevers, chills. Eyes: No visual changes or diplopia. No scleral icterus. ENT: No Headaches. No mouth sores or sore throat. Cardiovascular: No for chest pain, Yes for dyspnea on exertion, No for palpitations or Yes for loss of consciousness. No cough, hemoptysis, No for pleuritic pain, or phlebitis. Respiratory: No for cough or wheezing. No hematemesis. Gastrointestinal: No abdominal pain, blood in stools. Genitourinary: No dysuria, or hematuria. Musculoskeletal: No gait disturbance,    Integumentary: No rash or pruritis. Neurological: No headache, change in muscle strength, numbness or tingling. Psychiatric: No anxiety, or depression. Endocrine: No temperature intolerance. No excessive thirst, fluid intake, or urination. Hem/Lymph: No abnormal bruising or bleeding, blood clots or swollen lymph nodes. Allergic/Immunologic: No nasal congestion or hives. Physical Examination:  Vitals:    01/11/23 1135   BP: 134/86   Pulse: 72           Wt Readings from Last 3 Encounters:   01/11/23 (!) 408 lb (185.1 kg)   08/11/22 (!) 416 lb (188.7 kg)   06/06/22 (!) 423 lb 12.8 oz (192.2 kg)       Constitutional: Oriented. No distress. Head: Normocephalic and atraumatic. Mouth/Throat: Oropharynx is clear and moist.   Eyes: Conjunctivae clear without jaunduice. PERRL. Neck: Neck supple. No rigidity. No JVD present. Cardiovascular: Normal rate, regular rhythm, S1&S2. Pulmonary/Chest: Bilateral respiratory sounds. No wheezes, No rhonchi. Abdominal: Soft. Bowel sounds present. No distension, No tenderness. Musculoskeletal: No tenderness. 2+ BLE edema    Lymphadenopathy: Has no cervical adenopathy. Neurological: Alert and oriented. Cranial nerve appears intact, No Gross deficit   Skin: Skin is warm and dry. No rash noted. Psychiatric: Has a normal mood, affect and behavior     Labs:  Reviewed. No results for input(s): NA, K, CL, CO2, PHOS, BUN, CREATININE, CA in the last 72 hours. Invalid input(s):  TSH  No results for input(s): WBC, HGB, HCT, MCV, PLT in the last 72 hours. Lab Results   Component Value Date/Time    TROPONINI <0.01 03/29/2021 05:01 AM     No results found for: BNP  No results found for: PROTIME, INR  No results found for: CHOL, HDL, TRIG    ECG: Personally reviewed: NSR, HR 72, , , QTc 441    ECHO: 10/15/2021  Summary  Normal left ventricular size. Mild-moderate concentric left ventricular hypertrophy. Preserved ejection fraction estimated at 65%-70%. Normal diastolic function. The right ventricle was poorly visualized but appears mildly enlarged with normal function. Tapse: 2.48 cm. RV s: 15.9 cm/s Pacer / ICD wire is visualized in the right ventricle. IVC not well visualized. Estimated pulmonary artery systolic pressure is severely elevated at 14 mmHg assuming a right atrial pressure of 3 mmHg. A bubble study was performed and does not appear to show evidence of  shunting. Definity contrast agent was used to help visualize endocardial borders. ECHO:  6.4.2021 ([facility])  Study Conclusions   - Left ventricle: The cavity size is normal. Left ventricular     geometry shows evidence of concentric remodeling, with normal     ventricular mass and increased relative wall thickness. The     calculated ejection fraction was 58%. The stroke volume is 115ml. The stroke index is 36ml/m^2.  Ejection fraction (EF) was     calculated using 2D biplane Snow&apos;s method. - Aortic valve: Thickening, consistent with sclerosis. The mean     systolic gradient is 8mm Hg. The peak systolic gradient is 27RX     Hg. The valve area by the velocity-time integral method is     2.9cm^2. The valve area index by the velocity-time integral     method is 0.92cm^2/m^2. The valve area by the mean velocity     method is 2.2cm^2. - Inferior vena cava: The vessel was normal in size; the     respirophasic diameter changes were in the normal range (&gt;= 50%);     findings are consistent with normal central venous pressure. - Pericardium, extracardiac: A trivial pericardial effusion was     identified. Stress Test: N/A    Cardiac Angiography: N/A    Problem List:   Patient Active Problem List    Diagnosis Date Noted    Chronic respiratory failure with hypoxia ([address]     Cardiac pacemaker 07/12/2021    Chronic heart failure with preserved ejection fraction (HFpEF) ([address] 06/02/2021    Paroxysmal atrial fibrillation ([address] 06/02/2021    Syncope and collapse 03/25/2021    Obesity 03/25/2021    Chest pain 03/24/2021    Acute respiratory failure with hypoxia ([address] 03/24/2021    Plantar fasciitis, left 04/07/2017        Assessment:   1. Paroxysmal atrial fibrillation (HCC)           Cardiac HX: Juanjo Rich is a 56 y.o. man with a h/o HTN, hypoxia, O2 dependent, morbid obesity and syncope who p/w SOB and syncope (3/25/2021), found to be in acute on chronic dCHF and  in AF/AFL, spontaneously converted, O2 dependent, 14 day monitor (5/11/21- 5/26/21) showed avg HR 71 (), 350 pauses, longest 9.2 seconds, SB, 6 episodes AT, referred pt to ER, s/p dual ch PPM (6/4/21, Dr. Bhavani Louie), atrial lead displaced, s/p lead revision (6/22/21, Dr. Bhavani Louie). [id number] 2. TSH 1.04 (3/24/2021).       pAF  - In NSR - no AF on device check  - On Xarelto 20 mg QD - no bleeding - continue  - On Toprol 25 mg QD  - MICH - wears CPAP is compliant  - Echo - LA 4.3/47.7, normal EF SSS  - 14 day monitor (5/11/21- 5/26/21) showed avg HR 71 (), 350 pauses, longest 9.2 seconds, + syncopal events  - S/p BSI dual ch PPM (6/4/21, Dr. Ab Lopez)  - S/p lead revision (6/22/21, Dr. Ab Lopez)  - Device check shows 56% AP, 11% , no arrhythmias, other parameters stable. HTN  - BP elevated in office  - On recheck 128/82    dCHF  - Appears slightly fluid overloaded  - On lasix 40 mg BID, potassium 20 mEq BID, has not been taking medication, educated on importance  - On chronic O2 - not compliant, following w/ pulmonary  - Lifestyle modifications- diet, exercise, handouts given  - Follows w/ Dr. Luis Fernando Vanessa    EF of 65 -70%  TSH, 1.04 (3/24/2021)  No known systolic HF  ASA and Statin for CAD  Anticoagulation for AF  No Tobacco use. All questions and concerns were addressed to the patient/family. Alternatives to my treatment were discussed. The note was completed using EMR. Every effort was made to ensure accuracy; however, inadvertent computerized transcription errors may be present. Patient received education regarding their diagnosis, treatment and medications while in the office today. Halle Dickinson CNP  [facility]       I  have spent 35 minutes in care of the patient including direct face to face time, chart preparation, reviewing diagnostic testing, other provider notes and coordinating patient care.

## 2023-01-06 NOTE — PROGRESS NOTES
We received remote transmission from patient's dual chamber pacemaker monitor at home. Transmission shows normal sensing and pacing function. No new arrhythmias/events recorded. RYTHMIQ EGMs noted. Ap 56%   12%    EP physician will review. See interrogation under cardiology tab in the [address] [place] [address] [address] field for more details. Will continue to monitor remotely.     (End of 91-day monitoring period 1/3/23)

## 2023-01-11 ENCOUNTER — OFFICE VISIT (OUTPATIENT)
Dept: CARDIOLOGY CLINIC | Age: 58
End: 2023-01-11
Payer: MEDICAID

## 2023-01-11 ENCOUNTER — NURSE ONLY (OUTPATIENT)
Dept: CARDIOLOGY CLINIC | Age: 58
End: 2023-01-11
Payer: MEDICAID

## 2023-01-11 VITALS
DIASTOLIC BLOOD PRESSURE: 86 MMHG | SYSTOLIC BLOOD PRESSURE: 134 MMHG | BODY MASS INDEX: 52.38 KG/M2 | WEIGHT: 315 LBS | HEART RATE: 72 BPM

## 2023-01-11 DIAGNOSIS — I48.0 PAROXYSMAL ATRIAL FIBRILLATION (HCC): Primary | ICD-10-CM

## 2023-01-11 DIAGNOSIS — I50.32 CHRONIC HEART FAILURE WITH PRESERVED EJECTION FRACTION (HFPEF) (HCC): ICD-10-CM

## 2023-01-11 DIAGNOSIS — I49.5 SSS (SICK SINUS SYNDROME) (HCC): ICD-10-CM

## 2023-01-11 DIAGNOSIS — I10 ESSENTIAL HYPERTENSION: ICD-10-CM

## 2023-01-11 DIAGNOSIS — Z95.0 PACEMAKER: ICD-10-CM

## 2023-01-11 DIAGNOSIS — I48.0 PAROXYSMAL ATRIAL FIBRILLATION (HCC): ICD-10-CM

## 2023-01-11 DIAGNOSIS — Z95.0 CARDIAC PACEMAKER: Primary | ICD-10-CM

## 2023-01-11 DIAGNOSIS — Z79.01 ON CONTINUOUS ORAL ANTICOAGULATION: ICD-10-CM

## 2023-01-11 DIAGNOSIS — R55 SYNCOPE AND COLLAPSE: ICD-10-CM

## 2023-01-11 PROCEDURE — 99214 OFFICE O/P EST MOD 30 MIN: CPT | Performed by: NURSE PRACTITIONER

## 2023-01-11 PROCEDURE — 1036F TOBACCO NON-USER: CPT | Performed by: NURSE PRACTITIONER

## 2023-01-11 PROCEDURE — G8427 DOCREV CUR MEDS BY ELIG CLIN: HCPCS | Performed by: NURSE PRACTITIONER

## 2023-01-11 PROCEDURE — G8417 CALC BMI ABV UP PARAM F/U: HCPCS | Performed by: NURSE PRACTITIONER

## 2023-01-11 PROCEDURE — 93280 PM DEVICE PROGR EVAL DUAL: CPT | Performed by: INTERNAL MEDICINE

## 2023-01-11 PROCEDURE — 3075F SYST BP GE 130 - 139MM HG: CPT | Performed by: NURSE PRACTITIONER

## 2023-01-11 PROCEDURE — G8484 FLU IMMUNIZE NO ADMIN: HCPCS | Performed by: NURSE PRACTITIONER

## 2023-01-11 PROCEDURE — 3017F COLORECTAL CA SCREEN DOC REV: CPT | Performed by: NURSE PRACTITIONER

## 2023-01-11 PROCEDURE — 3079F DIAST BP 80-89 MM HG: CPT | Performed by: NURSE PRACTITIONER

## 2023-01-11 RX ORDER — KRILL/OM-3/DHA/EPA/PHOSPHO/AST 500MG-86MG
CAPSULE ORAL
COMMUNITY

## 2023-01-11 NOTE — PATIENT INSTRUCTIONS
Capsaicin cream.   Steroid cream.  Benadryl cream.       20 Tips For Changing the Way You Eat    If you think you are hungry, drink a glass of water or a cup of coffee before eating. Eat s-l-o-w-l-y! It takes your brain 20 minutes to catch up to your stomach and by then most of us have overeaten. Eat for 10 minutes and rest for 5 minutes. If you are still hungry, start over. Eat to live not live to eat! You control food, it does not control you! Your stomach is the size of your fist. If you eat more than that, you have overeaten. Eat when you are hungry and not because it is a scheduled time to eat. If you are hungry and it is not time yet to eat your meal, eat a small snack (such as 2 peanut butter crackers, 1 tsp of peanut butter, 15 peanuts, small apple or a cup of light popcorn). Make sure you take 10 minutes to eat this snack so it will hold you over to your next meal.    Eat your fruit first as it will help breakdown you meal.    If given a lot of options at a meal - pick only 3. People who put more than one item on their plate tend to eat more. Eat the one item you want the most - first! This will help fill you up more and control your hunger. Get moving! Get a pedometer and try to get in as many steps as you can. 2000 steps = 1 mile. Make 10,000 steps a day your goal.    We are the only country that practices eating to prevent getting hungry. Identify if you are an emotional eater - you want something salty or sweet instead of something healthy or you want to eat when you are not hungry. Sugar contributes nothing in the way of nutrients. Measure your level of hunger. The hungrier you are, the more fat you burn when you eat. If you eat dessert more than 30 minutes after a meal, it turns to fat. The calories in alcohol are used before stored fat calories. It is ok to miss a meal if you are not hungry. If snacks are not around the house, then they wont tempt you. Make better choices at the store for those times when you are really hungry. Remember that food will only make you feel better when you are really hungry not when you are sad, mad, stressed or depressed.

## 2023-01-11 NOTE — PROGRESS NOTES
Patient comes in for programming evaluation on their [facility] dual chamber pacemaker. (HX A/V revision 6.22.2021 Dr. Eugene Kay)    All sensing and pacing parameters appear unchanged from recent remote on 01.03.2023. Battery life 11 years  AP 56%.  11%. Rythmiq noted w/9% success. PACs decreased from 2.6K to 1.8K  PVCs increased from 538 to 702. Patient remains on furosemide, metoprolol, xarelto   Please see interrogation for more detail. Patient will see NPFW today and follow up in 3 months in office or remotely.

## 2023-03-01 ENCOUNTER — OFFICE VISIT (OUTPATIENT)
Dept: PULMONOLOGY | Age: 58
End: 2023-03-01
Payer: MEDICAID

## 2023-03-01 VITALS
OXYGEN SATURATION: 96 % | WEIGHT: 315 LBS | DIASTOLIC BLOOD PRESSURE: 80 MMHG | TEMPERATURE: 97.1 F | SYSTOLIC BLOOD PRESSURE: 130 MMHG | HEIGHT: 74 IN | BODY MASS INDEX: 40.43 KG/M2 | HEART RATE: 60 BPM

## 2023-03-01 DIAGNOSIS — J98.4 RESTRICTIVE LUNG DISEASE: Primary | ICD-10-CM

## 2023-03-01 DIAGNOSIS — R06.02 SOB (SHORTNESS OF BREATH): ICD-10-CM

## 2023-03-01 DIAGNOSIS — J96.11 CHRONIC RESPIRATORY FAILURE WITH HYPOXIA (HCC): ICD-10-CM

## 2023-03-01 PROCEDURE — G8427 DOCREV CUR MEDS BY ELIG CLIN: HCPCS | Performed by: INTERNAL MEDICINE

## 2023-03-01 PROCEDURE — G8417 CALC BMI ABV UP PARAM F/U: HCPCS | Performed by: INTERNAL MEDICINE

## 2023-03-01 PROCEDURE — 99213 OFFICE O/P EST LOW 20 MIN: CPT | Performed by: INTERNAL MEDICINE

## 2023-03-01 PROCEDURE — G8484 FLU IMMUNIZE NO ADMIN: HCPCS | Performed by: INTERNAL MEDICINE

## 2023-03-01 PROCEDURE — 1036F TOBACCO NON-USER: CPT | Performed by: INTERNAL MEDICINE

## 2023-03-01 PROCEDURE — 3017F COLORECTAL CA SCREEN DOC REV: CPT | Performed by: INTERNAL MEDICINE

## 2023-03-01 NOTE — PROGRESS NOTES
Chief complaint  This is a 56y.o. year old male  who comes to see me with a chief complaint of   Chief Complaint   Patient presents with    Follow-up     RLD       HPI  Here with a cc of Hypoxia     He says he is doing ok. Has on and off vague right sided back pains and neck pains. Seem to wax and wane. Worse in the mornings. Does ease up with movement or time. Not really using nor benefiting from albuterol. Still with oxygen at 2 liters with exertion. Does help when he needs it. Weight loss is slowing but he is still losing. Compliant with bpap from other office per him          Current Outpatient Medications:     Krill Oil 500 MG CAPS, Take by mouth, Disp: , Rfl:     furosemide (LASIX) 40 MG tablet, Take 1 tablet by mouth in the morning and 1 tablet before bedtime.  (Patient taking differently: Take 40 mg by mouth 2 times daily Takes infrequently), Disp: 180 tablet, Rfl: 3    potassium chloride (KLOR-CON M) 20 MEQ extended release tablet, TAKE 1 TABLET BY MOUTH TWICE DAILY (Patient taking differently: Takes infrequently), Disp: 180 tablet, Rfl: 3    metoprolol succinate (TOPROL XL) 25 MG extended release tablet, TAKE 1 TABLET BY MOUTH DAILY, Disp: 90 tablet, Rfl: 3    rivaroxaban (XARELTO) 20 MG TABS tablet, Take 1 tablet by mouth daily (with breakfast), Disp: 90 tablet, Rfl: 3    Turmeric (QC TUMERIC COMPLEX PO), Take by mouth, Disp: , Rfl:     Multiple Vitamin (MULTIVITAMIN ADULT PO), Take 1 tablet by mouth daily, Disp: , Rfl:     VITAMIN D PO, Take 2 tablets by mouth daily, Disp: , Rfl:     albuterol sulfate HFA (PROAIR HFA) 108 (90 Base) MCG/ACT inhaler, Inhale 2 puffs into the lungs every 4 hours as needed for Wheezing or Shortness of Breath, Disp: 1 Inhaler, Rfl: 3      PHYSICAL EXAM:  Vitals:    03/01/23 1343   BP: 130/80   Pulse: 60   Temp: 97.1 °F (36.2 °C)   SpO2: 96%       GENERAL:  Well nourished, alert, appears stated age, no distress, obese male  HEENT:  No scleral icterus, no conjunctival irritation  NECK:  No thyromegaly, no bruits  LYMPH:  No cervical or supraclavicular adenopathy  HEART:  Regular rate and rhythm, no murmurs  LUNGS: Clear but diminished due to his weight   ABDOMEN:  No distention, no organomegaly  EXTREMITIES:  ++ edema, no digital clubbing  NEURO:  No localizing deficits, CN II-XII intact    Pulmonary Function Testing 8/21  Moderate restriction with normal DLCO    Chest imaging from 3/21 is reviewed. My interpretation is scattered atelectasis      ECHO 10/21  Normal left ventricular size. Mild-moderate concentric left ventricular hypertrophy. Preserved ejection fraction estimated at 65%-70%. Normal diastolic function. The right ventricle was poorly visualized but appears mildly enlarged with normal function. Tapse: 2.48 cm. RV s: 15.9 cm/s Pacer / ICD wire is visualized in the right ventricle. IVC not well visualized. Estimated pulmonary artery systolic pressure is severely elevated at 14 mmHg assuming a right atrial pressure of 3 mmHg. A bubble study was performed and does not appear to show evidence of   shunting. Definity contrast agent was used to help visualize endocardial borders. Walk assessment 5/22  The patient ambulated 220 meters which is abnormal- 45-% predicted. His, heart rate response was normal, the blood pressure response was normal, oxygen saturations were abnormal as he desaturated while ambulating on room air. The patient desaturated to 87% while ambulating on room air, and was placed on 2 L of oxygen to keep saturations above 90%. The degree of symptoms based on the Celeste Dyspnea/Fatigue scale were increased with testing. This test does indicate the need for supplemental oxygen. I reviewed above labs and studies pertinent to this visit and date    Assessment/Plan:  1. Restrictive lung disease  Due to weight. Slowly losing and breathing is slowly improving    2.  Chronic respiratory failure with hypoxia (HCC)  Still using oxygen at 2 liters with exertion. Does benefit. Is trying to be more mobile    3. SOB (shortness of breath)  Due to #1. Some deconditioning as well. Does not respond to albuterol but has it just in case    4.  BMI 50.0-59.9, adult ([facility]  Working on losing weight     BPAP is followed outside of this office    Follow up in 6 months

## 2023-05-30 ENCOUNTER — APPOINTMENT (OUTPATIENT)
Dept: GENERAL RADIOLOGY | Age: 58
End: 2023-05-30
Payer: MEDICAID

## 2023-05-30 ENCOUNTER — HOSPITAL ENCOUNTER (EMERGENCY)
Age: 58
Discharge: HOME | End: 2023-05-30
Attending: EMERGENCY MEDICINE
Payer: MEDICAID

## 2023-05-30 VITALS
RESPIRATION RATE: 18 BRPM | DIASTOLIC BLOOD PRESSURE: 77 MMHG | OXYGEN SATURATION: 97 % | TEMPERATURE: 98.4 F | SYSTOLIC BLOOD PRESSURE: 148 MMHG | BODY MASS INDEX: 40.43 KG/M2 | HEIGHT: 74 IN | WEIGHT: 315 LBS | HEART RATE: 60 BPM

## 2023-05-30 DIAGNOSIS — S80.01XA CONTUSION OF RIGHT KNEE, INITIAL ENCOUNTER: Primary | ICD-10-CM

## 2023-05-30 PROCEDURE — 6370000000 HC RX 637 (ALT 250 FOR IP): Performed by: EMERGENCY MEDICINE

## 2023-05-30 PROCEDURE — 99283 EMERGENCY DEPT VISIT LOW MDM: CPT

## 2023-05-30 PROCEDURE — 73560 X-RAY EXAM OF KNEE 1 OR 2: CPT

## 2023-05-30 RX ORDER — RIVAROXABAN 20 MG/1
TABLET, FILM COATED ORAL
Qty: 90 TABLET | Refills: 3 | Status: SHIPPED | OUTPATIENT
Start: 2023-05-30

## 2023-05-30 RX ORDER — OXYCODONE HYDROCHLORIDE 5 MG/1
10 TABLET ORAL ONCE
Status: COMPLETED | OUTPATIENT
Start: 2023-05-30 | End: 2023-05-30

## 2023-05-30 RX ORDER — OXYCODONE HYDROCHLORIDE 5 MG/1
5 TABLET ORAL EVERY 6 HOURS PRN
Qty: 10 TABLET | Refills: 0 | Status: SHIPPED | OUTPATIENT
Start: 2023-05-30 | End: 2023-06-02

## 2023-05-30 RX ADMIN — OXYCODONE 10 MG: 5 TABLET ORAL at 17:51

## 2023-05-30 ASSESSMENT — PAIN - FUNCTIONAL ASSESSMENT: PAIN_FUNCTIONAL_ASSESSMENT: 0-10

## 2023-05-30 NOTE — DISCHARGE INSTRUCTIONS
Keep leg elevated while at rest.  Apply ice for 20 minutes at a time every few hours while awake to help decrease swelling. Make sure that you do range her knee at least a few times every 3-4 hours to avoid stiffening of the joint. Return for weakness of the leg or any other concerns. Do not take any anti-inflammatory medications such as ibuprofen or Aleve as these interact with your blood thinning medication. You may take Tylenol over-the-counter as needed for pain.   Take oxycodone as needed for uncontrolled pain

## 2023-05-30 NOTE — ED PROVIDER NOTES
[facility]          ATTENDING PHYSICIAN NOTE       Date of evaluation: 5/30/2023    Chief Complaint     Knee Injury (Pt reports falling earlier today. Initailly seen at urgent care-sent to ER)      History of Present Illness     Arthur Dominguez is a 66 y.o. male who presents with pain to his right knee. Patient states that he was walking into the dentist office when he tripped over a chair landing on both of his knees. He was able to get up on his own. He did go in and have his dental procedure performed. He states that when he was getting out of the chair he had significant pain to the knee. While he was driving home he decided he needed to be evaluated and went to urgent care who sent him directly here. Reports that he does take Xarelto, last dose was yesterday. Did not hit his head when he fell. ASSESSMENT / PLAN  (MEDICAL DECISION MAKING)     INITIAL VITALS: BP: (!) 148/77, Temp: 98.4 °F (36.9 °C), Pulse: 60, Respirations: 18, SpO2: 97 %      Arthur Dominguez is a 66 y.o. male ***. Medical Decision Making  Amount and/or Complexity of Data Reviewed  Radiology: ordered. Risk  Prescription drug management. Clinical Impression     No diagnosis found. Disposition     PATIENT REFERRED TO:  No follow-up provider specified. DISCHARGE MEDICATIONS:  New Prescriptions    No medications on file       DISPOSITION          Diagnostic Results and Other Data       RADIOLOGY:  No orders to display       LABS:   No results found for this visit on 05/30/23. ED BEDSIDE ULTRASOUND:  No results found. MOST RECENT VITALS:  BP: (!) 148/77,Temp: 98.4 °F (36.9 °C), Pulse: 60, Respirations: 18, SpO2: 97 %     ED Course     Nursing Notes, Past Medical Hx, Past Surgical Hx, Social Hx,Allergies, and Family Hx were reviewed.          The patient was given the following medications:  No orders of the defined types were placed in this

## 2023-06-08 ASSESSMENT — ENCOUNTER SYMPTOMS
BACK PAIN: 0
ABDOMINAL PAIN: 0
COLOR CHANGE: 1

## 2023-07-12 RX ORDER — METOPROLOL SUCCINATE 25 MG/1
25 TABLET, EXTENDED RELEASE ORAL DAILY
Qty: 90 TABLET | Refills: 3 | Status: SHIPPED | OUTPATIENT
Start: 2023-07-12

## 2023-07-25 ENCOUNTER — NURSE ONLY (OUTPATIENT)
Dept: CARDIOLOGY CLINIC | Age: 58
End: 2023-07-25

## 2023-07-25 DIAGNOSIS — Z95.0 CARDIAC PACEMAKER: Primary | ICD-10-CM

## 2023-07-25 DIAGNOSIS — I49.5 SSS (SICK SINUS SYNDROME) (HCC): ICD-10-CM

## 2023-07-30 NOTE — PROGRESS NOTES
Remote transmission received from patient's dual chamber pacemaker monitor at home. Known automatic safety switch from bipolar to unipolar occurred on Apr 13, 2023 due to an RV Pace Impedance measurement of >3000 ohms. Transmission shows normal sensing and pacing function. No new arrhythmias/events recorded since last alert transmission 04.13.23. Ap 62%   12%    End of 91-day monitoring period 7/25/23. EP physician will review. See interrogation under cardiology tab in the [address] field for more details. Will continue to monitor remotely.

## 2023-08-04 NOTE — ED NOTES
Call placed back to transportation and informed that [address] cannot transport.       Jenifer Rockwell RN  03/24/21 4545 Anesthesia Volume In Cc (Will Not Render If 0): 1

## 2023-08-21 DIAGNOSIS — J96.11 CHRONIC RESPIRATORY FAILURE WITH HYPOXIA (HCC): Primary | ICD-10-CM

## 2023-09-05 ENCOUNTER — HOSPITAL ENCOUNTER (OUTPATIENT)
Dept: CARDIAC REHAB | Age: 58
Setting detail: THERAPIES SERIES
Discharge: HOME | End: 2023-09-05
Payer: MEDICAID

## 2023-09-05 DIAGNOSIS — J96.11 CHRONIC RESPIRATORY FAILURE WITH HYPOXIA (HCC): ICD-10-CM

## 2023-09-05 PROCEDURE — 94618 PULMONARY STRESS TESTING: CPT | Performed by: INTERNAL MEDICINE

## 2023-09-05 PROCEDURE — 94618 PULMONARY STRESS TESTING: CPT

## 2023-09-05 NOTE — PROCEDURES
[facility]   Six Minute Walk Test    Malu LEWISB: 1965  Account Number: [de-identified]  AGE: 66 y.o.     OP test [x]   Pulmonary Rehab PRE []  POST   []    Diagnosis: Chronic respiratory failure with hypoxia     Referring Physician: Dr. Bebeto Booth    Gender [x]  male [] female AGE:58     Height:6 ft 2 in 191 cm    Weight:420 lbs  191 kg  Blood Pressure 122/70    Medications affecting heart rate:  Toprol XL  25 mg. QD, Albuterol sulfate  (90 Base) 2 puffs Q4h prn wheezing or SOB      Supplemental O2 during the test [x]  no [] yes  lpm     [] continuous []  intermittent    Device : [] NC []  HFNC    []  oximizer  [] mask      Laps completed: 6 (1 lap = 100 ft)        Baseline (resting) Walking End of Test (recovery)      Heart Rate 69   115  71    BP  122/70   132/76  120/70    Dyspnea  0.5    6   2 (Celeste scale)    Fatigue  0.5    6    2 (Celeste scale)    SpO2  95%   90%           96%          Stopped or paused before 6 mins? []  no [x] yes How long? 1 minute and 42 seconds    Symptoms at end of exercise:[] angina  [] dizziness  [x] back pain       []  no complaints []  other    Number of laps:6 (x 30.48 meters) + final partial lap: 22 meters     Total distance walked in 6 mins: 205 meters    Predicted distance: 497 meters  Percent predicted:41%              [] normal       [x] reduced     Summary: This is an outpatient 6 minute walk test, performed on room air. The patient ambulated 205 meters which is abnormal- 41 -% predicted. His, heart rate response was normal, the blood pressure response was normal, oxygen saturations were  low normal .  The patient desaturated to 90% while ambulating on room air. The degree of symptoms based on the Celeste Dyspnea/Fatigue scale were increased with testing. This test does not indicate the need for supplemental oxygen.           Collette Ngo, DO  Pulmonary Rehab Director

## 2023-09-07 ENCOUNTER — TELEPHONE (OUTPATIENT)
Dept: PULMONOLOGY | Age: 58
End: 2023-09-07

## 2023-09-07 NOTE — TELEPHONE ENCOUNTER
Pt called regarding Mychart message from Dr. Jeanette Phelps about 6 min walk test and oxygen. Pt states he is interested in turning it in since he no longer qualifies for it.

## 2023-10-31 ENCOUNTER — APPOINTMENT (OUTPATIENT)
Dept: GENERAL RADIOLOGY | Age: 58
End: 2023-10-31
Payer: MEDICAID

## 2023-10-31 ENCOUNTER — HOSPITAL ENCOUNTER (EMERGENCY)
Age: 58
Discharge: HOME | End: 2023-10-31
Attending: STUDENT IN AN ORGANIZED HEALTH CARE EDUCATION/TRAINING PROGRAM
Payer: MEDICAID

## 2023-10-31 VITALS
HEART RATE: 98 BPM | BODY MASS INDEX: 40.43 KG/M2 | DIASTOLIC BLOOD PRESSURE: 119 MMHG | WEIGHT: 315 LBS | TEMPERATURE: 97.9 F | RESPIRATION RATE: 24 BRPM | HEIGHT: 74 IN | OXYGEN SATURATION: 97 % | SYSTOLIC BLOOD PRESSURE: 181 MMHG

## 2023-10-31 DIAGNOSIS — R42 LIGHTHEADEDNESS: ICD-10-CM

## 2023-10-31 DIAGNOSIS — R00.2 PALPITATIONS: Primary | ICD-10-CM

## 2023-10-31 LAB
ANION GAP SERPL CALCULATED.3IONS-SCNC: 17 MMOL/L (ref 3–16)
BASOPHILS # BLD: 0.1 K/UL (ref 0–0.2)
BASOPHILS NFR BLD: 1 %
BUN SERPL-MCNC: 21 MG/DL (ref 7–20)
CALCIUM SERPL-MCNC: 9.6 MG/DL (ref 8.3–10.6)
CHLORIDE SERPL-SCNC: 99 MMOL/L (ref 99–110)
CO2 SERPL-SCNC: 23 MMOL/L (ref 21–32)
CREAT SERPL-MCNC: 1 MG/DL (ref 0.9–1.3)
DEPRECATED RDW RBC AUTO: 15.2 % (ref 12.4–15.4)
EKG ATRIAL RATE: 0 BPM
EKG ATRIAL RATE: 79 BPM
EKG DIAGNOSIS: NORMAL
EKG DIAGNOSIS: NORMAL
EKG P-R INTERVAL: 170 MS
EKG Q-T INTERVAL: 114 MS
EKG Q-T INTERVAL: 406 MS
EKG QRS DURATION: 118 MS
EKG QRS DURATION: 148 MS
EKG QTC CALCULATION (BAZETT): 197 MS
EKG QTC CALCULATION (BAZETT): 465 MS
EKG R AXIS: -62 DEGREES
EKG R AXIS: -73 DEGREES
EKG T AXIS: 0 DEGREES
EKG T AXIS: 72 DEGREES
EKG VENTRICULAR RATE: 181 BPM
EKG VENTRICULAR RATE: 79 BPM
EOSINOPHIL # BLD: 0.1 K/UL (ref 0–0.6)
EOSINOPHIL NFR BLD: 0.6 %
GFR SERPLBLD CREATININE-BSD FMLA CKD-EPI: >60 ML/MIN/{1.73_M2}
GLUCOSE SERPL-MCNC: 171 MG/DL (ref 70–99)
HCT VFR BLD AUTO: 42.4 % (ref 40.5–52.5)
HGB BLD-MCNC: 14.3 G/DL (ref 13.5–17.5)
LYMPHOCYTES # BLD: 1.7 K/UL (ref 1–5.1)
LYMPHOCYTES NFR BLD: 17 %
MCH RBC QN AUTO: 29 PG (ref 26–34)
MCHC RBC AUTO-ENTMCNC: 33.6 G/DL (ref 31–36)
MCV RBC AUTO: 86.2 FL (ref 80–100)
MONOCYTES # BLD: 0.9 K/UL (ref 0–1.3)
MONOCYTES NFR BLD: 9.6 %
NEUTROPHILS # BLD: 7 K/UL (ref 1.7–7.7)
NEUTROPHILS NFR BLD: 71.8 %
NT-PROBNP SERPL-MCNC: 158 PG/ML (ref 0–124)
PLATELET # BLD AUTO: 230 K/UL (ref 135–450)
PMV BLD AUTO: 9 FL (ref 5–10.5)
POTASSIUM SERPL-SCNC: 3.8 MMOL/L (ref 3.5–5.1)
RBC # BLD AUTO: 4.92 M/UL (ref 4.2–5.9)
SODIUM SERPL-SCNC: 139 MMOL/L (ref 136–145)
TROPONIN, HIGH SENSITIVITY: 25 NG/L (ref 0–22)
TROPONIN, HIGH SENSITIVITY: 29 NG/L (ref 0–22)
WBC # BLD AUTO: 9.7 K/UL (ref 4–11)

## 2023-10-31 PROCEDURE — 83880 ASSAY OF NATRIURETIC PEPTIDE: CPT

## 2023-10-31 PROCEDURE — 93005 ELECTROCARDIOGRAM TRACING: CPT | Performed by: STUDENT IN AN ORGANIZED HEALTH CARE EDUCATION/TRAINING PROGRAM

## 2023-10-31 PROCEDURE — 80048 BASIC METABOLIC PNL TOTAL CA: CPT

## 2023-10-31 PROCEDURE — 99285 EMERGENCY DEPT VISIT HI MDM: CPT

## 2023-10-31 PROCEDURE — 71045 X-RAY EXAM CHEST 1 VIEW: CPT

## 2023-10-31 PROCEDURE — 85025 COMPLETE CBC W/AUTO DIFF WBC: CPT

## 2023-10-31 PROCEDURE — 84484 ASSAY OF TROPONIN QUANT: CPT

## 2023-10-31 ASSESSMENT — PAIN SCALES - GENERAL: PAINLEVEL_OUTOF10: 7

## 2023-10-31 ASSESSMENT — ENCOUNTER SYMPTOMS
SHORTNESS OF BREATH: 1
VOMITING: 0
COUGH: 0
TROUBLE SWALLOWING: 0
NAUSEA: 0

## 2023-10-31 ASSESSMENT — PAIN - FUNCTIONAL ASSESSMENT: PAIN_FUNCTIONAL_ASSESSMENT: 0-10

## 2023-10-31 NOTE — DISCHARGE INSTRUCTIONS
Your EKG initially showed findings that were suspicious for atrial fibrillation, however this resolved on our repeat EKG before you left. Your troponin levels, which are your cardiac markers, were mildly elevated but were stable. I recommend you reach out to your cardiologist to discuss your visit today, and see if they want to do any further testing, such as an echocardiogram, or change any of your medications. Return to the ER if you develop severe chest pain, difficulty breathing or any other concerns.

## 2023-10-31 NOTE — ED NOTES
RN ambulated pt with portable pulse ox. Pt O2 prior to ambulation was 95 and pulse was 95.  During ambulation the O2 dropped to 94% and HR      Ebonie Ernst  10/31/23 1645

## 2023-10-31 NOTE — ED NOTES
Pt given discharge paperwork. Pt educated amador the importance of follow up care. Pt verbalized understanding. Pt IV discontinued. Pt ambulated to [facility] and is discharged at this time.      Esteban Soriano RN  10/31/23 0410       Ebonie Carrera  10/31/23 3801

## 2024-02-26 RX ORDER — RIVAROXABAN 20 MG/1
TABLET, FILM COATED ORAL
Qty: 30 TABLET | Refills: 0 | Status: SHIPPED | OUTPATIENT
Start: 2024-02-26

## 2025-01-03 ENCOUNTER — TELEPHONE (OUTPATIENT)
Dept: FAMILY MEDICINE CLINIC | Age: 60
End: 2025-01-03

## 2025-01-03 NOTE — TELEPHONE ENCOUNTER
----- Message from Fidelina JULIEN sent at 1/3/2025  2:27 PM EST -----  Regarding: [facility] Appointment Request  [facility] Appointment Request    Patient needs appointment for [facility] Appointment Type: New to Provider.    Patient Requested Dates(s): the soonest the possible  Patient Requested Time: early afternoon  Provider Name: ramonita Juan Ames    Reason for Appointment Request: Established Patient - No appointments available during search  --------------------------------------------------------------------------------------------------------------------------    Relationship to Patient: Self     Call Back Information: OK to leave message on voicemail  Preferred Call Back Number: Phone [phone]       Note: PT need a Annual Physical

## 2025-01-03 NOTE — TELEPHONE ENCOUNTER
Called pt back. He is looking for a male provider and declined scheduling with either provider accepting new patients at this time.